# Patient Record
Sex: MALE | Race: WHITE | NOT HISPANIC OR LATINO | Employment: OTHER | URBAN - METROPOLITAN AREA
[De-identification: names, ages, dates, MRNs, and addresses within clinical notes are randomized per-mention and may not be internally consistent; named-entity substitution may affect disease eponyms.]

---

## 2021-05-27 ENCOUNTER — RECORDS - HEALTHEAST (OUTPATIENT)
Dept: ADMINISTRATIVE | Facility: CLINIC | Age: 81
End: 2021-05-27

## 2024-02-21 ENCOUNTER — LAB REQUISITION (OUTPATIENT)
Dept: LAB | Facility: CLINIC | Age: 84
End: 2024-02-21

## 2024-02-21 DIAGNOSIS — D64.9 ANEMIA, UNSPECIFIED: ICD-10-CM

## 2024-02-22 LAB
ANION GAP SERPL CALCULATED.3IONS-SCNC: 12 MMOL/L (ref 7–15)
BUN SERPL-MCNC: 20 MG/DL (ref 8–23)
CALCIUM SERPL-MCNC: 8.5 MG/DL (ref 8.8–10.2)
CHLORIDE SERPL-SCNC: 105 MMOL/L (ref 98–107)
CREAT SERPL-MCNC: 0.86 MG/DL (ref 0.67–1.17)
DEPRECATED HCO3 PLAS-SCNC: 21 MMOL/L (ref 22–29)
EGFRCR SERPLBLD CKD-EPI 2021: 86 ML/MIN/1.73M2
ERYTHROCYTE [DISTWIDTH] IN BLOOD BY AUTOMATED COUNT: 16.6 % (ref 10–15)
GLUCOSE SERPL-MCNC: 88 MG/DL (ref 70–99)
HCT VFR BLD AUTO: 28.6 % (ref 40–53)
HGB BLD-MCNC: 8.6 G/DL (ref 13.3–17.7)
MCH RBC QN AUTO: 30.5 PG (ref 26.5–33)
MCHC RBC AUTO-ENTMCNC: 30.1 G/DL (ref 31.5–36.5)
MCV RBC AUTO: 101 FL (ref 78–100)
PLATELET # BLD AUTO: 555 10E3/UL (ref 150–450)
POTASSIUM SERPL-SCNC: 4.5 MMOL/L (ref 3.4–5.3)
RBC # BLD AUTO: 2.82 10E6/UL (ref 4.4–5.9)
SODIUM SERPL-SCNC: 138 MMOL/L (ref 135–145)
WBC # BLD AUTO: 9.2 10E3/UL (ref 4–11)

## 2024-02-22 PROCEDURE — 36415 COLL VENOUS BLD VENIPUNCTURE: CPT | Performed by: NURSE PRACTITIONER

## 2024-02-22 PROCEDURE — 80048 BASIC METABOLIC PNL TOTAL CA: CPT | Performed by: NURSE PRACTITIONER

## 2024-02-22 PROCEDURE — 85027 COMPLETE CBC AUTOMATED: CPT | Performed by: NURSE PRACTITIONER

## 2024-02-22 PROCEDURE — P9603 ONE-WAY ALLOW PRORATED MILES: HCPCS | Performed by: NURSE PRACTITIONER

## 2024-03-06 ENCOUNTER — LAB REQUISITION (OUTPATIENT)
Dept: LAB | Facility: CLINIC | Age: 84
End: 2024-03-06

## 2024-03-06 DIAGNOSIS — D64.9 ANEMIA, UNSPECIFIED: ICD-10-CM

## 2024-03-07 LAB
ERYTHROCYTE [DISTWIDTH] IN BLOOD BY AUTOMATED COUNT: 16.3 % (ref 10–15)
HCT VFR BLD AUTO: 34.6 % (ref 40–53)
HGB BLD-MCNC: 10.5 G/DL (ref 13.3–17.7)
MCH RBC QN AUTO: 31.1 PG (ref 26.5–33)
MCHC RBC AUTO-ENTMCNC: 30.3 G/DL (ref 31.5–36.5)
MCV RBC AUTO: 102 FL (ref 78–100)
PLATELET # BLD AUTO: 255 10E3/UL (ref 150–450)
RBC # BLD AUTO: 3.38 10E6/UL (ref 4.4–5.9)
WBC # BLD AUTO: 6.3 10E3/UL (ref 4–11)

## 2024-03-07 PROCEDURE — P9603 ONE-WAY ALLOW PRORATED MILES: HCPCS | Performed by: INTERNAL MEDICINE

## 2024-03-07 PROCEDURE — 85027 COMPLETE CBC AUTOMATED: CPT | Performed by: INTERNAL MEDICINE

## 2024-03-07 PROCEDURE — 36415 COLL VENOUS BLD VENIPUNCTURE: CPT | Performed by: INTERNAL MEDICINE

## 2024-11-25 ENCOUNTER — HOSPITAL ENCOUNTER (EMERGENCY)
Facility: CLINIC | Age: 84
Discharge: HOME | End: 2024-11-25
Attending: EMERGENCY MEDICINE | Admitting: EMERGENCY MEDICINE
Payer: MEDICARE

## 2024-11-25 VITALS
SYSTOLIC BLOOD PRESSURE: 192 MMHG | HEART RATE: 50 BPM | TEMPERATURE: 96.9 F | RESPIRATION RATE: 18 BRPM | OXYGEN SATURATION: 100 % | DIASTOLIC BLOOD PRESSURE: 81 MMHG | WEIGHT: 164 LBS

## 2024-11-25 DIAGNOSIS — Z13.9 ENCOUNTER FOR MEDICAL SCREENING EXAMINATION: ICD-10-CM

## 2024-11-25 LAB
ATRIAL RATE - MUSE: 58 BPM
DIASTOLIC BLOOD PRESSURE - MUSE: NORMAL MMHG
HOLD SPECIMEN: NORMAL
INTERPRETATION ECG - MUSE: NORMAL
P AXIS - MUSE: 52 DEGREES
POTASSIUM SERPL-SCNC: 4.5 MMOL/L (ref 3.4–5.3)
PR INTERVAL - MUSE: 216 MS
QRS DURATION - MUSE: 110 MS
QT - MUSE: 424 MS
QTC - MUSE: 416 MS
R AXIS - MUSE: 5 DEGREES
SYSTOLIC BLOOD PRESSURE - MUSE: NORMAL MMHG
T AXIS - MUSE: 17 DEGREES
VENTRICULAR RATE- MUSE: 58 BPM

## 2024-11-25 PROCEDURE — 93005 ELECTROCARDIOGRAM TRACING: CPT | Performed by: EMERGENCY MEDICINE

## 2024-11-25 PROCEDURE — 36415 COLL VENOUS BLD VENIPUNCTURE: CPT | Performed by: EMERGENCY MEDICINE

## 2024-11-25 PROCEDURE — 99284 EMERGENCY DEPT VISIT MOD MDM: CPT

## 2024-11-25 PROCEDURE — 84132 ASSAY OF SERUM POTASSIUM: CPT | Performed by: EMERGENCY MEDICINE

## 2024-11-25 ASSESSMENT — ACTIVITIES OF DAILY LIVING (ADL): ADLS_ACUITY_SCORE: 41

## 2024-11-25 ASSESSMENT — COLUMBIA-SUICIDE SEVERITY RATING SCALE - C-SSRS
2. HAVE YOU ACTUALLY HAD ANY THOUGHTS OF KILLING YOURSELF IN THE PAST MONTH?: NO
1. IN THE PAST MONTH, HAVE YOU WISHED YOU WERE DEAD OR WISHED YOU COULD GO TO SLEEP AND NOT WAKE UP?: NO
6. HAVE YOU EVER DONE ANYTHING, STARTED TO DO ANYTHING, OR PREPARED TO DO ANYTHING TO END YOUR LIFE?: NO

## 2024-11-25 NOTE — DISCHARGE INSTRUCTIONS
The potassium here today is normal at 4.5.  Can continue your previous medications and follow-up with your doctor as needed.    Return to the ER for any new or worsening concerns.

## 2024-11-25 NOTE — ED TRIAGE NOTES
Patient presents to the ED with family from [facility] for concerns of elevated potassium. Had some labs drawn a couple days ago and was told his potassium was HIGH at 6.0.

## 2024-11-26 NOTE — ED PROVIDER NOTES
EMERGENCY DEPARTMENT ENCOUNTER      NAME: Shaun Berkowitz  AGE: 84 year old male  YOB: 1940  MRN: [id number]  EVALUATION DATE & TIME: 11/25/2024  3:23 PM    PCP: Marcella Patterson    ED PROVIDER: Kaia Gagnon MD      Chief Complaint   Patient presents with    Abnormal Labs         FINAL IMPRESSION:  1. Encounter for medical screening examination          ED COURSE & MEDICAL DECISION MAKING:    Pertinent Labs & Imaging studies reviewed. (See chart for details)    12:04 PM I introduced myself to the patient, obtained patient history, performed a physical exam, and discussed plan for ED workup including potential diagnostic laboratory/imaging studies and interventions.    84 year old male with a pertinent history of prior stroke with aphasia, anemia, BPH, coronary artery disease, CHF who presents to this ED for evaluation of concern for elevated potassium at his facility.  Reportedly his potassium was 6 a couple of days ago on outpatient routine draw.  They had not repeated this and sent him here for a repeat testing.  Has not had any issues with his potassium in the past per family.  He has no complaints here and is entirely asymptomatic.  Repeat potassium here is normal at 4.5.  I am suspicious that it may have been hemolyzed at the other facility.  EKG without any signs of hyperkalemia.  It is unchanged from his prior EKG and no signs of acute ischemia.  He is in a sinus rhythm.  He is hypertensive here however this did come down to 192/81 and is on multiple blood pressure medications.  He has no complaints and is asymptomatic and do not feel that this warrants further workup at this time as he again is asymptomatic and may have some whitecoat hypertension here in the ER.  Advised he follow-up with his primary care doctor.  Patient and family were in agreement with this plan.  He was discharged home in stable condition.  Given indications for return.         At the conclusion of the encounter I  discussed the results of all of the tests and the disposition. The questions were answered. The patient or family acknowledged understanding and was agreeable with the care plan.       Medical Decision Making  Obtained supplemental history:Supplemental history obtained?: Documented in chart  Reviewed external records: External records reviewed?: Documented in chart  Care impacted by chronic illness:Documented in Chart  Care significantly affected by social determinants of health:N/A  Did you consider but not order tests?: Work up considered but not performed and documented in chart, if applicable  Did you interpret images independently?: Independent interpretation of ECG and images noted in documentation, when applicable.  Consultation discussion with other provider:Did you involve another provider (consultant, , pharmacy, etc.)?: No  Discharge. No recommendations on prescription strength medication(s). See documentation for any additional details.    Not Applicable      MEDICATIONS GIVEN IN THE EMERGENCY:  Medications - No data to display    NEW PRESCRIPTIONS STARTED AT TODAY'S ER VISIT  There are no discharge medications for this patient.         =================================================================    HPI    Patient information was obtained from: Patient, wife, daughter    Shaun Berkowitz is a 84 year old male with a pertinent history of prior stroke with aphasia, anemia, BPH, coronary artery disease, CHF who presents to this ED for evaluation of concern for elevated potassium at his facility.  He lives in assisted living and they had drawn labs routinely a couple days ago and reportedly his potassium was 6 so they wanted this rechecked and sent him in here to do so.  He has absolutely no complaints.  Family agrees that he has been acting at his baseline.  Has had no nausea, vomiting, diarrhea.  Denies any chest pain, shortness of breath, abdominal pain, numbness, tingling, or new weakness.  He  feels entirely normal here.      REVIEW OF SYSTEMS   Pertinent positives and negatives are documented in the HPI. All other systems reviewed and are negative.      PAST MEDICAL HISTORY:  No past medical history on file.    PAST SURGICAL HISTORY:  No past surgical history on file.        CURRENT MEDICATIONS:    No current outpatient medications on file.      ALLERGIES:  No Known Allergies    FAMILY HISTORY:  No family history on file.    SOCIAL HISTORY:   Social History     Socioeconomic History    Marital status:      Social Drivers of Health     Financial Resource Strain: Low Risk  (2/10/2024)    Received from [email or web address]    Overall Financial Resource Strain (CARDIA)     Difficulty of Paying Living Expenses: Not hard at all   Food Insecurity: No Food Insecurity (2/10/2024)    Received from [email or web address]    Hunger Vital Sign     Worried About Running Out of Food in the Last Year: Never true     Ran Out of Food in the Last Year: Never true   Transportation Needs: No Transportation Needs (2/10/2024)    [facility]    PRAPARE - Transportation     Lack of Transportation (Medical): No     Lack of Transportation (Non-Medical): No   Physical Activity: Insufficiently Active (7/31/2023)    Received from [email or web address]    Exercise Vital Sign     Days of Exercise per Week: 7 days     Minutes of Exercise per Session: 10 min   Stress: No Stress Concern Present (7/31/2023)    Received from [email or web address]    [employer] Occupational [employer] - Occupational Stress Questionnaire     Feeling of Stress : Not at all   Social Connections: Moderately Isolated (7/31/2023)    [facility]    Social Connection and Isolation Panel [NHANES]     Frequency of Communication with Friends and Family: More  than three times a week     Frequency of Social Gatherings with Friends and Family: More than three times a week     Attends Voodoo Services: Never     Active Member of Clubs or Organizations: No     Attends Club or Organization Meetings: Never     Marital Status:    Interpersonal Safety: Patient Declined (2/9/2024)    Received from [facility]     Do you feel UNSAFE in any of your personal relationships with your family members or any other acquaintances?: Deferred   Housing Stability: Low Risk  (2/10/2024)    Received from [facility]     Retired - What is your living situation today? : I have a steady place to live       VITALS:  BP (!) 192/81   Pulse 50   Temp 96.9  F (36.1  C) (Temporal)   Resp 18   Wt 74.4 kg (164 lb)   SpO2 100%     PHYSICAL EXAM    Physical Exam  Constitutional: NAD, GCS 15  HENT: Normocephalic, Atraumatic, Bilateral external ears normal, Oropharynx normal, mucous membranes moist, Nose normal. Neck-  Normal range of motion, No tenderness, Supple, No stridor.    Eyes: PERRL, EOMI, Conjunctiva normal, No discharge.   Respiratory: Normal breath sounds, No respiratory distress, No wheezing or crackles, Speaks in full sentences easily.    Cardiovascular: Normal heart rate, Regular rhythm, No murmurs, No rubs, No gallops. 2+ radial pulses bilaterally  GI: Bowel sounds normal, Soft, No tenderness, No masses, No rebound or guarding. No   Musculoskeletal: 2+ DP pulses. No notable lower extremity edema.  No cyanosis, No clubbing. Good range of motion in all major joints. No tenderness to palpation or major deformities noted.   Integument: Warm, Dry, No erythema, No rash. No petechiae.   Neurologic: Alert & oriented x 3, 5/5 strength in all 4 extremities bilaterally. Sensation intact to light touch in all 4 extremities and the face bilaterally. No focal deficits noted.    Psychiatric: Affect normal, Judgment normal, Mood normal. Cooperative.      LAB:  All pertinent labs reviewed and interpreted.  Results for orders placed or performed during the hospital encounter of 11/25/24   Extra Blue Top Tube   Result Value Ref Range    Hold Specimen JIC    Extra Red Top Tube   Result Value Ref Range    Hold Specimen JIC    Extra Purple Top Tube   Result Value Ref Range    Hold Specimen JIC    Result Value Ref Range    Potassium 4.5 3.4 - 5.3 mmol/L   ECG 12-LEAD WITH MUSE (LHE)   Result Value Ref Range    Systolic Blood Pressure  mmHg    Diastolic Blood Pressure  mmHg    Ventricular Rate 58 BPM    Atrial Rate 58 BPM    LA Interval 216 ms    QRS Duration 110 ms     ms    QTc 416 ms    P Axis 52 degrees    R AXIS 5 degrees    T Axis 17 degrees    Interpretation ECG       Sinus bradycardia with sinus arrhythmia with 1st degree A-V block  Possible Left atrial enlargement  Left ventricular hypertrophy  Inferior infarct (cited on or before 20-Dec-2003)  Abnormal ECG  When compared with ECG of 20-Dec-2003 08:27,  No significant change was found  Confirmed by SEE ED PROVIDER NOTE FOR, ECG INTERPRETATION ([id number]),  JOSE ROMERO ([id number]) on 11/25/2024 3:26:37 PM         RADIOLOGY:  Reviewed all pertinent imaging. Please see official radiology report.  No orders to display       EKG:    Performed at: 3:26 pm    Impression:     Sinus bradycardia with sinus arrhythmia with 1st degree A-V block  Possible Left atrial enlargement  Left ventricular hypertrophy   Unchanged from prior on 5/8/2023 in Care Everywhere.    I have independently reviewed and interpreted the EKG(s) documented above.      Kaia Gagnon MD  [facility] EMERGENCY ROOM  [address] [address] [phone]       Kaia Gagnon MD  11/26/24 7963

## 2025-03-23 ENCOUNTER — APPOINTMENT (OUTPATIENT)
Dept: RADIOLOGY | Facility: CLINIC | Age: 85
DRG: 480 | End: 2025-03-23
Attending: EMERGENCY MEDICINE
Payer: MEDICARE

## 2025-03-23 ENCOUNTER — HOSPITAL ENCOUNTER (INPATIENT)
Facility: CLINIC | Age: 85
LOS: 3 days | Discharge: NURSING, REHAB OR LONG-TERM CARE | DRG: 480 | End: 2025-03-26
Attending: EMERGENCY MEDICINE | Admitting: HOSPITALIST
Payer: MEDICARE

## 2025-03-23 ENCOUNTER — APPOINTMENT (OUTPATIENT)
Dept: RADIOLOGY | Facility: CLINIC | Age: 85
DRG: 480 | End: 2025-03-23
Attending: STUDENT IN AN ORGANIZED HEALTH CARE EDUCATION/TRAINING PROGRAM
Payer: MEDICARE

## 2025-03-23 DIAGNOSIS — R33.9 URINARY RETENTION WITH INCOMPLETE BLADDER EMPTYING: ICD-10-CM

## 2025-03-23 DIAGNOSIS — S72.002A HIP FRACTURE, LEFT, CLOSED, INITIAL ENCOUNTER (H): Primary | ICD-10-CM

## 2025-03-23 DIAGNOSIS — I50.32 CHRONIC DIASTOLIC CONGESTIVE HEART FAILURE (H): ICD-10-CM

## 2025-03-23 LAB
ABO + RH BLD: NORMAL
ANION GAP SERPL CALCULATED.3IONS-SCNC: 11 MMOL/L (ref 7–15)
APTT PPP: 28 SECONDS (ref 22–38)
ATRIAL RATE - MUSE: 76 BPM
BASOPHILS # BLD AUTO: 0.1 10E3/UL (ref 0–0.2)
BASOPHILS NFR BLD AUTO: 1 %
BLD GP AB SCN SERPL QL: NEGATIVE
BUN SERPL-MCNC: 26.2 MG/DL (ref 8–23)
CALCIUM SERPL-MCNC: 9.6 MG/DL (ref 8.8–10.4)
CHLORIDE SERPL-SCNC: 108 MMOL/L (ref 98–107)
CREAT SERPL-MCNC: 0.99 MG/DL (ref 0.67–1.17)
DIASTOLIC BLOOD PRESSURE - MUSE: 61 MMHG
EGFRCR SERPLBLD CKD-EPI 2021: 76 ML/MIN/1.73M2
EOSINOPHIL # BLD AUTO: 0.6 10E3/UL (ref 0–0.7)
EOSINOPHIL NFR BLD AUTO: 7 %
ERYTHROCYTE [DISTWIDTH] IN BLOOD BY AUTOMATED COUNT: 12.5 % (ref 10–15)
GLUCOSE SERPL-MCNC: 133 MG/DL (ref 70–99)
HCO3 SERPL-SCNC: 22 MMOL/L (ref 22–29)
HCT VFR BLD AUTO: 38.6 % (ref 40–53)
HGB BLD-MCNC: 12.6 G/DL (ref 13.3–17.7)
IMM GRANULOCYTES # BLD: 0 10E3/UL
IMM GRANULOCYTES NFR BLD: 0 %
INR PPP: 0.96 (ref 0.85–1.15)
INTERPRETATION ECG - MUSE: NORMAL
LYMPHOCYTES # BLD AUTO: 2.8 10E3/UL (ref 0.8–5.3)
LYMPHOCYTES NFR BLD AUTO: 34 %
MAGNESIUM SERPL-MCNC: 2.2 MG/DL (ref 1.7–2.3)
MCH RBC QN AUTO: 33.3 PG (ref 26.5–33)
MCHC RBC AUTO-ENTMCNC: 32.6 G/DL (ref 31.5–36.5)
MCV RBC AUTO: 102 FL (ref 78–100)
MONOCYTES # BLD AUTO: 0.6 10E3/UL (ref 0–1.3)
MONOCYTES NFR BLD AUTO: 7 %
NEUTROPHILS # BLD AUTO: 4.3 10E3/UL (ref 1.6–8.3)
NEUTROPHILS NFR BLD AUTO: 51 %
NRBC # BLD AUTO: 0 10E3/UL
NRBC BLD AUTO-RTO: 0 /100
P AXIS - MUSE: 58 DEGREES
PLATELET # BLD AUTO: 232 10E3/UL (ref 150–450)
POTASSIUM SERPL-SCNC: 5.2 MMOL/L (ref 3.4–5.3)
PR INTERVAL - MUSE: 208 MS
QRS DURATION - MUSE: 102 MS
QT - MUSE: 392 MS
QTC - MUSE: 441 MS
R AXIS - MUSE: 23 DEGREES
RBC # BLD AUTO: 3.78 10E6/UL (ref 4.4–5.9)
SODIUM SERPL-SCNC: 141 MMOL/L (ref 135–145)
SPECIMEN EXP DATE BLD: NORMAL
SYSTOLIC BLOOD PRESSURE - MUSE: 166 MMHG
T AXIS - MUSE: -23 DEGREES
VENTRICULAR RATE- MUSE: 76 BPM
WBC # BLD AUTO: 8.3 10E3/UL (ref 4–11)

## 2025-03-23 PROCEDURE — 99285 EMERGENCY DEPT VISIT HI MDM: CPT

## 2025-03-23 PROCEDURE — 80048 BASIC METABOLIC PNL TOTAL CA: CPT | Performed by: EMERGENCY MEDICINE

## 2025-03-23 PROCEDURE — 86900 BLOOD TYPING SEROLOGIC ABO: CPT | Performed by: EMERGENCY MEDICINE

## 2025-03-23 PROCEDURE — 73502 X-RAY EXAM HIP UNI 2-3 VIEWS: CPT

## 2025-03-23 PROCEDURE — 85025 COMPLETE CBC W/AUTO DIFF WBC: CPT | Performed by: EMERGENCY MEDICINE

## 2025-03-23 PROCEDURE — 99222 1ST HOSP IP/OBS MODERATE 55: CPT | Mod: AI | Performed by: STUDENT IN AN ORGANIZED HEALTH CARE EDUCATION/TRAINING PROGRAM

## 2025-03-23 PROCEDURE — 73552 X-RAY EXAM OF FEMUR 2/>: CPT | Mod: LT

## 2025-03-23 PROCEDURE — 36415 COLL VENOUS BLD VENIPUNCTURE: CPT | Performed by: EMERGENCY MEDICINE

## 2025-03-23 PROCEDURE — 250N000011 HC RX IP 250 OP 636: Mod: JZ | Performed by: STUDENT IN AN ORGANIZED HEALTH CARE EDUCATION/TRAINING PROGRAM

## 2025-03-23 PROCEDURE — 250N000013 HC RX MED GY IP 250 OP 250 PS 637: Performed by: STUDENT IN AN ORGANIZED HEALTH CARE EDUCATION/TRAINING PROGRAM

## 2025-03-23 PROCEDURE — 83735 ASSAY OF MAGNESIUM: CPT | Performed by: STUDENT IN AN ORGANIZED HEALTH CARE EDUCATION/TRAINING PROGRAM

## 2025-03-23 PROCEDURE — 86850 RBC ANTIBODY SCREEN: CPT | Performed by: EMERGENCY MEDICINE

## 2025-03-23 PROCEDURE — 85730 THROMBOPLASTIN TIME PARTIAL: CPT | Performed by: EMERGENCY MEDICINE

## 2025-03-23 PROCEDURE — 85610 PROTHROMBIN TIME: CPT | Performed by: EMERGENCY MEDICINE

## 2025-03-23 PROCEDURE — 120N000001 HC R&B MED SURG/OB

## 2025-03-23 PROCEDURE — 250N000011 HC RX IP 250 OP 636: Performed by: STUDENT IN AN ORGANIZED HEALTH CARE EDUCATION/TRAINING PROGRAM

## 2025-03-23 PROCEDURE — 93005 ELECTROCARDIOGRAM TRACING: CPT | Performed by: STUDENT IN AN ORGANIZED HEALTH CARE EDUCATION/TRAINING PROGRAM

## 2025-03-23 RX ORDER — METOPROLOL SUCCINATE 25 MG/1
25 TABLET, EXTENDED RELEASE ORAL 2 TIMES DAILY
Status: DISCONTINUED | OUTPATIENT
Start: 2025-03-23 | End: 2025-03-26 | Stop reason: HOSPADM

## 2025-03-23 RX ORDER — PREGABALIN 75 MG/1
75 CAPSULE ORAL 2 TIMES DAILY
COMMUNITY
Start: 2024-02-16

## 2025-03-23 RX ORDER — NALOXONE HYDROCHLORIDE 0.4 MG/ML
0.4 INJECTION, SOLUTION INTRAMUSCULAR; INTRAVENOUS; SUBCUTANEOUS
Status: DISCONTINUED | OUTPATIENT
Start: 2025-03-23 | End: 2025-03-26 | Stop reason: HOSPADM

## 2025-03-23 RX ORDER — GUAIFENESIN 200 MG/10ML
200 LIQUID ORAL EVERY 4 HOURS PRN
Status: DISCONTINUED | OUTPATIENT
Start: 2025-03-23 | End: 2025-03-26 | Stop reason: HOSPADM

## 2025-03-23 RX ORDER — IBUPROFEN 400 MG/1
400 TABLET, FILM COATED ORAL EVERY 6 HOURS PRN
COMMUNITY

## 2025-03-23 RX ORDER — LOSARTAN POTASSIUM 25 MG/1
25 TABLET ORAL DAILY
COMMUNITY
Start: 2025-03-14

## 2025-03-23 RX ORDER — OMEPRAZOLE 20 MG/1
20 CAPSULE, DELAYED RELEASE ORAL EVERY EVENING
COMMUNITY
Start: 2024-02-16

## 2025-03-23 RX ORDER — OMEPRAZOLE 20 MG/1
20 CAPSULE, DELAYED RELEASE ORAL EVERY EVENING
Status: DISCONTINUED | OUTPATIENT
Start: 2025-03-23 | End: 2025-03-23

## 2025-03-23 RX ORDER — AMLODIPINE BESYLATE 10 MG/1
10 TABLET ORAL DAILY
COMMUNITY
Start: 2025-03-14

## 2025-03-23 RX ORDER — ASPIRIN 81 MG/1
81 TABLET ORAL DAILY
Status: DISCONTINUED | OUTPATIENT
Start: 2025-03-24 | End: 2025-03-24 | Stop reason: DRUGHIGH

## 2025-03-23 RX ORDER — ATORVASTATIN CALCIUM 40 MG/1
80 TABLET, FILM COATED ORAL AT BEDTIME
Status: DISCONTINUED | OUTPATIENT
Start: 2025-03-23 | End: 2025-03-26 | Stop reason: HOSPADM

## 2025-03-23 RX ORDER — ACETAMINOPHEN 325 MG/1
650 TABLET ORAL EVERY 6 HOURS PRN
Status: DISCONTINUED | OUTPATIENT
Start: 2025-03-23 | End: 2025-03-23

## 2025-03-23 RX ORDER — METOPROLOL SUCCINATE 25 MG/1
25 TABLET, EXTENDED RELEASE ORAL 2 TIMES DAILY
COMMUNITY
Start: 2023-05-12

## 2025-03-23 RX ORDER — LOSARTAN POTASSIUM 25 MG/1
25 TABLET ORAL DAILY
Status: DISCONTINUED | OUTPATIENT
Start: 2025-03-24 | End: 2025-03-26 | Stop reason: HOSPADM

## 2025-03-23 RX ORDER — LIDOCAINE 40 MG/G
CREAM TOPICAL
Status: DISCONTINUED | OUTPATIENT
Start: 2025-03-23 | End: 2025-03-26 | Stop reason: HOSPADM

## 2025-03-23 RX ORDER — HYDROMORPHONE HYDROCHLORIDE 1 MG/ML
0.3 INJECTION, SOLUTION INTRAMUSCULAR; INTRAVENOUS; SUBCUTANEOUS
Status: DISCONTINUED | OUTPATIENT
Start: 2025-03-23 | End: 2025-03-24

## 2025-03-23 RX ORDER — HEPARIN SODIUM 5000 [USP'U]/.5ML
5000 INJECTION, SOLUTION INTRAVENOUS; SUBCUTANEOUS EVERY 8 HOURS
Status: COMPLETED | OUTPATIENT
Start: 2025-03-23 | End: 2025-03-23

## 2025-03-23 RX ORDER — PANTOPRAZOLE SODIUM 40 MG/1
40 TABLET, DELAYED RELEASE ORAL EVERY EVENING
Status: DISCONTINUED | OUTPATIENT
Start: 2025-03-23 | End: 2025-03-26 | Stop reason: HOSPADM

## 2025-03-23 RX ORDER — ACETAMINOPHEN 650 MG/1
650 SUPPOSITORY RECTAL EVERY 6 HOURS PRN
Status: DISCONTINUED | OUTPATIENT
Start: 2025-03-23 | End: 2025-03-23

## 2025-03-23 RX ORDER — ACETAMINOPHEN 500 MG
1000 TABLET ORAL 3 TIMES DAILY
Status: DISCONTINUED | OUTPATIENT
Start: 2025-03-23 | End: 2025-03-24

## 2025-03-23 RX ORDER — NALOXONE HYDROCHLORIDE 0.4 MG/ML
0.2 INJECTION, SOLUTION INTRAMUSCULAR; INTRAVENOUS; SUBCUTANEOUS
Status: DISCONTINUED | OUTPATIENT
Start: 2025-03-23 | End: 2025-03-26 | Stop reason: HOSPADM

## 2025-03-23 RX ORDER — MIRTAZAPINE 15 MG/1
15 TABLET, FILM COATED ORAL AT BEDTIME
Status: DISCONTINUED | OUTPATIENT
Start: 2025-03-23 | End: 2025-03-26 | Stop reason: HOSPADM

## 2025-03-23 RX ORDER — HYDROMORPHONE HCL IN WATER/PF 6 MG/30 ML
0.2 PATIENT CONTROLLED ANALGESIA SYRINGE INTRAVENOUS
Status: DISCONTINUED | OUTPATIENT
Start: 2025-03-23 | End: 2025-03-24

## 2025-03-23 RX ORDER — LOPERAMIDE HYDROCHLORIDE 2 MG/1
2 TABLET ORAL 4 TIMES DAILY PRN
COMMUNITY

## 2025-03-23 RX ORDER — MIRTAZAPINE 15 MG/1
15 TABLET, FILM COATED ORAL AT BEDTIME
COMMUNITY
Start: 2024-02-16

## 2025-03-23 RX ORDER — POLYETHYLENE GLYCOL 3350 17 G/17G
17 POWDER, FOR SOLUTION ORAL
Status: DISCONTINUED | OUTPATIENT
Start: 2025-03-23 | End: 2025-03-24

## 2025-03-23 RX ORDER — ERGOCALCIFEROL 1.25 MG/1
50000 CAPSULE, LIQUID FILLED ORAL WEEKLY
COMMUNITY

## 2025-03-23 RX ORDER — AMLODIPINE BESYLATE 10 MG/1
10 TABLET ORAL DAILY
Status: DISCONTINUED | OUTPATIENT
Start: 2025-03-24 | End: 2025-03-26 | Stop reason: HOSPADM

## 2025-03-23 RX ORDER — POLYETHYLENE GLYCOL 3350 17 G/17G
1 POWDER, FOR SOLUTION ORAL DAILY
COMMUNITY

## 2025-03-23 RX ORDER — ASPIRIN 81 MG/1
81 TABLET ORAL DAILY
Status: ON HOLD | COMMUNITY
End: 2025-03-26

## 2025-03-23 RX ORDER — POLYETHYLENE GLYCOL 3350 17 G/17G
1 POWDER, FOR SOLUTION ORAL DAILY PRN
COMMUNITY

## 2025-03-23 RX ORDER — ACETAMINOPHEN 500 MG
1000 TABLET ORAL 3 TIMES DAILY
COMMUNITY

## 2025-03-23 RX ORDER — CALCIUM CARBONATE 500 MG/1
1000 TABLET, CHEWABLE ORAL 4 TIMES DAILY PRN
Status: DISCONTINUED | OUTPATIENT
Start: 2025-03-23 | End: 2025-03-26 | Stop reason: HOSPADM

## 2025-03-23 RX ORDER — ATORVASTATIN CALCIUM 80 MG/1
80 TABLET, FILM COATED ORAL AT BEDTIME
COMMUNITY
Start: 2023-07-18

## 2025-03-23 RX ORDER — PREGABALIN 75 MG/1
75 CAPSULE ORAL 2 TIMES DAILY
Status: DISCONTINUED | OUTPATIENT
Start: 2025-03-23 | End: 2025-03-26 | Stop reason: HOSPADM

## 2025-03-23 RX ORDER — OXYCODONE HYDROCHLORIDE 5 MG/1
5 TABLET ORAL EVERY 4 HOURS PRN
Status: DISCONTINUED | OUTPATIENT
Start: 2025-03-23 | End: 2025-03-24

## 2025-03-23 RX ORDER — ERGOCALCIFEROL 1.25 MG/1
50000 CAPSULE, LIQUID FILLED ORAL WEEKLY
Status: DISCONTINUED | OUTPATIENT
Start: 2025-03-29 | End: 2025-03-26 | Stop reason: HOSPADM

## 2025-03-23 RX ADMIN — MIRTAZAPINE 15 MG: 15 TABLET, FILM COATED ORAL at 22:28

## 2025-03-23 RX ADMIN — ATORVASTATIN CALCIUM 80 MG: 40 TABLET, FILM COATED ORAL at 22:28

## 2025-03-23 RX ADMIN — ACETAMINOPHEN 1000 MG: 500 TABLET ORAL at 18:50

## 2025-03-23 RX ADMIN — POLYETHYLENE GLYCOL 3350 17 G: 17 POWDER, FOR SOLUTION ORAL at 19:26

## 2025-03-23 RX ADMIN — OXYCODONE 5 MG: 5 TABLET ORAL at 20:37

## 2025-03-23 RX ADMIN — PANTOPRAZOLE SODIUM 40 MG: 40 TABLET, DELAYED RELEASE ORAL at 19:26

## 2025-03-23 RX ADMIN — HYDROMORPHONE HYDROCHLORIDE 0.2 MG: 0.2 INJECTION, SOLUTION INTRAMUSCULAR; INTRAVENOUS; SUBCUTANEOUS at 22:56

## 2025-03-23 RX ADMIN — PREGABALIN 75 MG: 75 CAPSULE ORAL at 20:37

## 2025-03-23 RX ADMIN — METOPROLOL SUCCINATE 25 MG: 25 TABLET, EXTENDED RELEASE ORAL at 19:44

## 2025-03-23 RX ADMIN — HEPARIN SODIUM 5000 UNITS: 5000 INJECTION, SOLUTION INTRAVENOUS; SUBCUTANEOUS at 19:33

## 2025-03-23 ASSESSMENT — ACTIVITIES OF DAILY LIVING (ADL)
ADLS_ACUITY_SCORE: 41
ADLS_ACUITY_SCORE: 54
ADLS_ACUITY_SCORE: 41
ADLS_ACUITY_SCORE: 41
ADLS_ACUITY_SCORE: 54
ADLS_ACUITY_SCORE: 54

## 2025-03-23 NOTE — ED PROVIDER NOTES
EMERGENCY DEPARTMENT ENCOUNTER     NAME: Shaun Berkowitz   AGE: 84 year old male   YOB: 1940   MRN: [id number]   EVALUATION DATE & TIME: 3/23/2025  3:53 PM   PCP: Marcella Patterson     Chief Complaint   Patient presents with    Fall    Hip Pain   :    FINAL IMPRESSION       1. Hip fracture, left, closed, initial encounter (H)           ED COURSE & MEDICAL DECISION MAKING    5:07 PM I spoke with Dr. Correa, [facility].   5:09 PM I spoke with Dr. Ramos, hospitalist.    Pertinent Labs & Imaging studies reviewed. (See chart for details)   84 year old male  presents to the Emergency Department for evaluation of a mechanical fall resulting in left hip pain. Initial Vitals Reviewed. Initial exam notable for uncomfortable appearing male who is sitting with his left hip externally rotated and I strongly suspect fracture.  He has had previous surgical repair from fracture in the past.  Neurovascular status is more difficult to examine given hemiparesis from previous stroke, but there is no signs of a cold extremity.  X-ray reviewed interpreted by me and confirmed by radiology does show a fracture.  Preop labs initiated and currently patient is being kept NPO.  Case discussed with orthopedics and hospitalist and the hope is that if he can get medically cleared they will do surgery tonight versus needing to wait until tomorrow based on medical clearance issues.         At the conclusion of the encounter I discussed the results of all of the tests and the disposition. The questions were answered. The patient or family acknowledged understanding and was agreeable with the care plan.     0 minutes critical care time, see procedure note below for details if relevant    Medical Decision Making    History:  Supplemental history from: EMS  External Record(s) reviewed: Outpatient Record: Last nursing home visit 3/20/2024    Work Up:  Chart documentation includes differential considered and any EKGs or imaging  independently interpreted by provider, where specified.  In additional to work up documented, I considered the following work up:     External consultation:  Discussion of management with another provider: Documented in chart, if applicable, hospitalist, orthopedics    Complicating factors:  Care impacted by chronic illness: CVA  Care affected by social determinants of health: Access to Medical Care    Disposition considerations: Admit.    Not Applicable   None              MEDICATIONS GIVEN IN THE EMERGENCY:   Medications - No data to display   NEW PRESCRIPTIONS STARTED AT TODAY'S ER VISIT   New Prescriptions    No medications on file     ================================================================   HISTORY OF PRESENT ILLNESS       Patient information was obtained from: Patient and EMS  Use of Intrepreter: N/A   Shaun Berkowitz is a 84 year old male with history of previous hip fracture, CVA who presents for evaluation of left hip pain after a fall.  Patient notes a mechanical fall, now having left hip pain.  Denies hitting his head or losing consciousness.  No other complaints of injury or pain.  EMS gave 100 mcg of fentanyl during transport.    ================================================================        PAST HISTORY     PAST MEDICAL HISTORY:   No past medical history on file.   PAST SURGICAL HISTORY:   No past surgical history on file.   CURRENT MEDICATIONS:   amLODIPine (NORVASC) 10 MG tablet  atorvastatin (LIPITOR) 80 MG tablet  losartan (COZAAR) 25 MG tablet  metoprolol succinate ER (TOPROL XL) 25 MG 24 hr tablet  mirtazapine (REMERON) 15 MG tablet  omeprazole (PRILOSEC) 20 MG DR capsule  pregabalin (LYRICA) 75 MG capsule      ALLERGIES:   No Known Allergies   FAMILY HISTORY:   No family history on file.   SOCIAL HISTORY:   Social History     Socioeconomic History    Marital status:      Social Drivers of Health     Financial Resource Strain: Low Risk  (2/10/2024)    [facility]    Overall Financial Resource Strain (CARDIA)     Difficulty of Paying Living Expenses: Not hard at all   Food Insecurity: No Food Insecurity (2/10/2024)    Received from [facility]    Hunger Vital Sign     Worried About Running Out of Food in the Last Year: Never true     Ran Out of Food in the Last Year: Never true   Transportation Needs: No Transportation Needs (2/10/2024)    [facility]    PRAPARE - Transportation     Lack of Transportation (Medical): No     Lack of Transportation (Non-Medical): No   Physical Activity: Insufficiently Active (7/31/2023)    Received from [facility]    Exercise Vital Sign     Days of Exercise per Week: 7 days     Minutes of Exercise per Session: 10 min   Stress: No Stress Concern Present (7/31/2023)    Received from [facility]    [employer] [employer] of Occupational Health - Occupational Stress Questionnaire     Feeling of Stress : Not at all   Social Connections: Moderately Isolated (7/31/2023)    Received from [facility]    Social Connection and Isolation Panel [NHANES]     Frequency of Communication with Friends and Family: More than three times a week     Frequency of Social Gatherings with Friends and Family: More than three times a week     Attends Amish Services: Never     Active Member of Clubs or Organizations: No     Attends Club or Organization Meetings: Never     Marital Status:    Interpersonal Safety: Patient Declined (2/9/2024)    Received from [facility] [employer]     Do you feel UNSAFE in any of your personal relationships with your family members or any other acquaintances?: Deferred   Housing Stability: Low Risk  (2/10/2024)    Received from [facility]     [facility] Housing Domain     Retired - What is your living situation today? : I have a steady place to live        VITALS  Patient Vitals for the past 24 hrs:   BP Temp Temp src Pulse Resp SpO2 Height Weight   03/23/25 1558 (!) 184/92 97.5  F (36.4  C) Oral 72 17 94 % 1.727 m (5' 8\") 72.6 kg (160 lb)        ================================================================    PHYSICAL EXAM     VITAL SIGNS: BP (!) 184/92   Pulse 72   Temp 97.5  F (36.4  C) (Oral)   Resp 17   Ht 1.727 m (5' 8\")   Wt 72.6 kg (160 lb)   SpO2 94%   BMI 24.33 kg/m     Constitutional:  Awake, uncomfortable appearing  HENT:  Atraumatic, oropharynx without exudate or erythema, membranes moist  Lymph:  No adenopathy  Eyes: EOM intact, PERRL, no injection  Neck: Supple  Respiratory:  Clear to auscultation bilaterally, no wheezes or crackles   Cardiovascular:  Regular rate and rhythm, single S1 and S2   GI:  Soft, nontender, nondistended, no rebound or guarding   Musculoskeletal:  Moves all extremities, no lower extremity edema, sitting with his left hip externally rotated and flexed with tenderness over the anterior and lateral hip.  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   Labs Ordered and Resulted from Time of ED Arrival to Time of ED Departure   BASIC METABOLIC PANEL - Abnormal       Result Value    Sodium 141      Potassium 5.2      Chloride 108 (*)     Carbon Dioxide (CO2) 22      Anion Gap 11      Urea Nitrogen 26.2 (*)     Creatinine 0.99      GFR Estimate 76      Calcium 9.6      Glucose 133 (*)    CBC WITH PLATELETS AND DIFFERENTIAL - Abnormal    WBC Count 8.3      RBC Count 3.78 (*)     Hemoglobin 12.6 (*)     Hematocrit 38.6 (*)      (*)     MCH 33.3 (*)     MCHC 32.6      RDW 12.5      " Platelet Count 232      % Neutrophils 51      % Lymphocytes 34      % Monocytes 7      % Eosinophils 7      % Basophils 1      % Immature Granulocytes 0      NRBCs per 100 WBC 0      Absolute Neutrophils 4.3      Absolute Lymphocytes 2.8      Absolute Monocytes 0.6      Absolute Eosinophils 0.6      Absolute Basophils 0.1      Absolute Immature Granulocytes 0.0      Absolute NRBCs 0.0     PARTIAL THROMBOPLASTIN TIME - Normal    aPTT 28     INR - Normal    INR 0.96     TYPE AND SCREEN, ADULT    ABO/RH(D) O POS      Antibody Screen Negative      SPECIMEN EXPIRATION DATE [id number]     ABO/RH TYPE AND SCREEN        ===============================================================  RADIOLOGY       Reviewed all pertinent imaging. Please see official radiology report.   XR Pelvis and Hip Left 2 Views   Final Result   IMPRESSION: Acute mildly displaced left proximal femoral fracture centered in the intertrochanteric region. Bones are markedly demineralized and there is mild left hip arthrosis. Evidence of prior left pelvic/acetabular ORIF.      Atherosclerotic vascular calcifications.      NOTE: ABNORMAL REPORT      THE DICTATION ABOVE DESCRIBES AN ABNORMALITY FOR WHICH FOLLOW-UP IS NEEDED.       XR Femur Left 2 Views    (Results Pending)         ================================================================  EKG         I have independently reviewed and interpreted the EKG(s) documented above.     ================================================================  PROCEDURES           Racheal Hoyos M.D.   Emergency Medicine   [facility] EMERGENCY ROOM  [address] [address]  [phone]  Dept: [phone]        Racheal Hoyos MD  03/23/25 1738

## 2025-03-23 NOTE — ED TRIAGE NOTES
Arrived to ED via EMS after a fall. Patient unable to move left hip. Pain is 10/10 upon movement. EMS gave 100mcg fentanyl during transport.      Triage Assessment (Adult)       Row Name 03/23/25 1558          Triage Assessment    Airway WDL WDL        Respiratory WDL    Respiratory WDL WDL        Skin Circulation/Temperature WDL    Skin Circulation/Temperature WDL WDL        Cardiac WDL    Cardiac WDL WDL        Cognitive/Neuro/Behavioral WDL    Cognitive/Neuro/Behavioral WDL WDL

## 2025-03-23 NOTE — TREATMENT PLAN
Ortho treatment plan:    Called by ED staff about Shaun Berkowitz who is an 84M with left intertrochanteric femur fracture after a fall at his care facility earlier today.    Patient not personally evaluated at time of this note.    Plan:  - Admit to medicine. Appreciate perioperative optimization/clearance.  - OR as soon as tonight if medically cleared/optimized, otherwise tomorrow if not cleared  - NWB/bedrest until OR  - full femur XR ordered for fx eval  - NPO  - Full consult to follow.    Nestor Correa MD  [facility]    Update: Discussed with admitting medicine MD. Patient has new arrhythmia. Will undergo workup prior to clearance for OR. Anticipate OR tomorrow. NPO at midnight. Full consult in AM.

## 2025-03-23 NOTE — H&P
[facility] MEDICINE ADMISSION HISTORY AND PHYSICAL   Date of Admission: 3/23/2025  Shaun Berkowitz, 1940, [id number]    Identification/Summary:   Pedro Pablo Berkowitz is a 83 year old male, with a hx of CAD s/p CABG in 1999, ischemic cardiomyopathy, Heart failure with mildly reduced ejection fraction, pulm HTN, chronic L ICA occlusion, with prior CVAs, residual right sided weakness and aphasia, pernicious anemia, syncope, chronic back pain, pelvic fracture and humerus fracture in 2/2024 s/p fixation at [facility] who presented to following a fall at home.  In the ED, blood pressure 184/92, other vital signs stable.  Irregular heart rate was noticed in ED.  Labs are remarkable for anemia 12.6, which has improved from prior creatinine of 0.99.  X-ray showed a left hip fracture.  Orthopedic surgery was consulted in the ED, offering surgery for him.  Hospitalist team will admit patient and gather medical information to prep for surgery.    Assessment and Plan:  Left hip fracture  Surgery when medically cleared per surgery  Oxycodone 2.5 to 5 mg for pain every 6 hours as needed  Strict bedrest  1 dose heparin DVT Prophylaxis tonight    Preoperative evaluation  Irregular heart rate noticed in the ED  Order EKG  Order echocardiogram  Retrieve previous medical records. Calling his PCP office.    Irregular heartbeat  Heart rate ranged from 40 to 66 when I was in patient's room  EKG  Start telemetry  Echo  Check magnesium    Left ICA occlusion  History of CVA with right hemiparesis and aphasia  Hold aspirin for now  Continue atorvastatin    CAD s/p CABG in 1999  Hold aspirin for now  Continue atorvastatin    Pernicious anemia  -Monitor    Hypertension  -Continue amlodipine 10 mg  Continue metoprolol 25 mg  Hold losartan 25 mg    CKD stage II  eGFR 76, creatinine 0.99 on admission    Code Status: Full Code    IVF: None    FoleyNot present    Disposition: Inpatient     Clinically Significant  Risk Factors Present on Admission          # Hyperchloremia: Highest Cl = 108 mmol/L in last 2 days, will monitor as appropriate            # Drug Induced Platelet Defect: home medication list includes an antiplatelet medication   # Hypertension: Home medication list includes antihypertensive(s)                      Chief Complaint Fall and L hip pain     HISTORY   Pedro Pablo Berkowitz is a 83 year old male, with a hx of CAD s/p CABG in 1999, ischemic cardiomyopathy, pulm HTN, chronic L ICA occlusion, with prior CVAs, residual right sided weakness and aphasia, on ASA, pernicious anemia, syncope, chronic back pain, pelvic fracture and humerus fracture in 2/2024 [place]/p [place] [facility] [place] presented to following a fall at home.     History obtained from patient.  Patient is sometimes confused with my question, not sure if it is a hearing problem or comprehension problem but he would still give me an answer.    Patient lives with wife at assisted living.  He was cooking when he was trying to move around today.  He missed a step and fell onto his left hip.  He then has pain on the left hip whenever he moves.  He was on the ground for about 15 minutes and was helped get up.  He denies feeling dizzy, loss of consciousness, chest pain, cough, dyspnea, diarrhea recently.      Past Medical History     No past medical history on file.     Patient Active Problem List    Diagnosis Date Noted    Hip fracture, left, closed, initial encounter (H) 03/23/2025     Priority: Medium     Surgical History   No past surgical history on file.  Family History    No family history on file.   Social History        Allergies   No Known Allergies  Prior to Admission Medications      Prior to Admission Medications   Prescriptions Last Dose Informant Patient Reported? Taking?   acetaminophen (TYLENOL) 500 MG tablet 3/23/2025 Noon  Yes Yes   Sig: Take 1,000 mg by mouth 3 times daily.   amLODIPine (NORVASC) 10 MG tablet 3/23/2025 Morning  Yes  Yes   Sig: Take 10 mg by mouth daily.   aspirin 81 MG EC tablet 3/23/2025 Morning  Yes Yes   Sig: Take 81 mg by mouth daily.   atorvastatin (LIPITOR) 80 MG tablet 3/22/2025 Bedtime  Yes Yes   Sig: Take 80 mg by mouth at bedtime.   dimethicone-zinc oxide (AMADO PROTECT) external cream Unknown  Yes Yes   Sig: Apply topically daily as needed for dry skin or other. To intact stage 1 pressure area and or irritated skin, rash, or excoriation.   ibuprofen (ADVIL/MOTRIN) 400 MG tablet Unknown  Yes Yes   Sig: Take 400 mg by mouth every 6 hours as needed (pain).   loperamide (IMODIUM A-D) 2 MG tablet Unknown  Yes Yes   Sig: Take 2 mg by mouth 4 times daily as needed for diarrhea. Take 2 tabs (4 mg) at first loose stool, then 1 tab (2 mg) as needed after each subsequent loose stool.   losartan (COZAAR) 25 MG tablet 3/23/2025 Morning  Yes Yes   Sig: Take 25 mg by mouth daily.   metoprolol succinate ER (TOPROL XL) 25 MG 24 hr tablet 3/23/2025 Morning  Yes Yes   Sig: Take 25 mg by mouth 2 times daily.   mirtazapine (REMERON) 15 MG tablet 3/22/2025 Bedtime  Yes Yes   Sig: Take 15 mg by mouth at bedtime.   omeprazole (PRILOSEC) 20 MG DR capsule 3/22/2025 Evening  Yes Yes   Sig: Take 20 mg by mouth every evening.   polyethylene glycol (MIRALAX) 17 g packet 3/23/2025 Morning  Yes Yes   Sig: Take 1 packet by mouth daily.   polyethylene glycol (MIRALAX) 17 g packet Unknown  Yes Yes   Sig: Take 1 packet by mouth daily as needed for constipation.   pregabalin (LYRICA) 75 MG capsule 3/23/2025 Morning  Yes Yes   Sig: Take 75 mg by mouth 2 times daily.   vitamin D2 (ERGOCALCIFEROL) 30308 units (1250 mcg) capsule 3/22/2025  Yes Yes   Sig: Take 50,000 Units by mouth once a week. Saturdays      Facility-Administered Medications: None      Review of Systems     A 12 point comprehensive review of systems was negative except as noted above in HPI.    PHYSICAL EXAMINATION     Vitals      Temp:  [97.5  F (36.4  C)] 97.5  F (36.4  C)  Pulse:  [72]  72  Resp:  [17] 17  BP: (184)/(92) 184/92  SpO2:  [94 %] 94 %    Examination     General Appearance: Alert and wake, not in distress  Respiratory: clear lungs, no crackles or wheezing  Cardiovascular: rhythmic, normal S1 and S2, no murmur  GI: soft, non-tender, normal bowel sound  Neurology: oriented x 3  Psych: cooperative and calm, normal affect      Pertinent Lab     Results for orders placed or performed during the hospital encounter of 03/23/25 (from the past 24 hours)   CBC with platelets differential    Narrative    The following orders were created for panel order CBC with platelets differential.  Procedure                               Abnormality         Status                     ---------                               -----------         ------                     CBC with platelets and ...[[id number]]  Abnormal            Final result                 Please view results for these tests on the individual orders.   Basic metabolic panel   Result Value Ref Range    Sodium 141 135 - 145 mmol/L    Potassium 5.2 3.4 - 5.3 mmol/L    Chloride 108 (H) 98 - 107 mmol/L    Carbon Dioxide (CO2) 22 22 - 29 mmol/L    Anion Gap 11 7 - 15 mmol/L    Urea Nitrogen 26.2 (H) 8.0 - 23.0 mg/dL    Creatinine 0.99 0.67 - 1.17 mg/dL    GFR Estimate 76 >60 mL/min/1.73m2    Calcium 9.6 8.8 - 10.4 mg/dL    Glucose 133 (H) 70 - 99 mg/dL   Partial thromboplastin time   Result Value Ref Range    aPTT 28 22 - 38 Seconds   INR   Result Value Ref Range    INR 0.96 0.85 - 1.15   ABO/Rh type and screen    Narrative    The following orders were created for panel order ABO/Rh type and screen.  Procedure                               Abnormality         Status                     ---------                               -----------         ------                     Adult Type and Screen[[id number]]                           Final result                 Please view results for these tests on the individual orders.   CBC with platelets and  differential   Result Value Ref Range    WBC Count 8.3 4.0 - 11.0 10e3/uL    RBC Count 3.78 (L) 4.40 - 5.90 10e6/uL    Hemoglobin 12.6 (L) 13.3 - 17.7 g/dL    Hematocrit 38.6 (L) 40.0 - 53.0 %     (H) 78 - 100 fL    MCH 33.3 (H) 26.5 - 33.0 pg    MCHC 32.6 31.5 - 36.5 g/dL    RDW 12.5 10.0 - 15.0 %    Platelet Count 232 150 - 450 10e3/uL    % Neutrophils 51 %    % Lymphocytes 34 %    % Monocytes 7 %    % Eosinophils 7 %    % Basophils 1 %    % Immature Granulocytes 0 %    NRBCs per 100 WBC 0 <1 /100    Absolute Neutrophils 4.3 1.6 - 8.3 10e3/uL    Absolute Lymphocytes 2.8 0.8 - 5.3 10e3/uL    Absolute Monocytes 0.6 0.0 - 1.3 10e3/uL    Absolute Eosinophils 0.6 0.0 - 0.7 10e3/uL    Absolute Basophils 0.1 0.0 - 0.2 10e3/uL    Absolute Immature Granulocytes 0.0 <=0.4 10e3/uL    Absolute NRBCs 0.0 10e3/uL   Adult Type and Screen   Result Value Ref Range    ABO/RH(D) O POS     Antibody Screen Negative Negative    SPECIMEN EXPIRATION DATE [id number]    XR Pelvis and Hip Left 2 Views    Narrative    EXAM: XR PELVIS AND HIP LEFT 2 VIEWS  LOCATION: [facility]  DATE: 3/23/2025    INDICATION: Fall, pain.  COMPARISON: None.      Impression    IMPRESSION: Acute mildly displaced left proximal femoral fracture centered in the intertrochanteric region. Bones are markedly demineralized and there is mild left hip arthrosis. Evidence of prior left pelvic/acetabular ORIF.    Atherosclerotic vascular calcifications.    NOTE: ABNORMAL REPORT    THE DICTATION ABOVE DESCRIBES AN ABNORMALITY FOR WHICH FOLLOW-UP IS NEEDED.        Pertinent Radiology     Radiology Results:   Recent Results (from the past 24 hours)   XR Pelvis and Hip Left 2 Views    Narrative    EXAM: XR PELVIS AND HIP LEFT 2 VIEWS  LOCATION: [facility]  DATE: 3/23/2025    INDICATION: Fall, pain.  COMPARISON: None.      Impression    IMPRESSION: Acute mildly displaced left proximal femoral fracture centered in the  intertrochanteric region. Bones are markedly demineralized and there is mild left hip arthrosis. Evidence of prior left pelvic/acetabular ORIF.    Atherosclerotic vascular calcifications.    NOTE: ABNORMAL REPORT    THE DICTATION ABOVE DESCRIBES AN ABNORMALITY FOR WHICH FOLLOW-UP IS NEEDED.      I spoke with Dr. Hoyos or Dr. Correa and patient's family to discuss patient's care.    KESHA SCHMIDT MD  [facility]   Phone: #[phone]

## 2025-03-23 NOTE — PHARMACY-ADMISSION MEDICATION HISTORY
Pharmacist Admission Medication History    Admission medication history is complete. The information provided in this note is only as accurate as the sources available at the time of the update.    Information Source(s): Facility ([facility] medication list/MAR via phone    Pertinent Information: none    Changes made to PTA medication list:  Added: acetaminophen, amlodipine, aspirin, atorvastatin, losartan, metoprolol, mirtazapine, omeprazole, Miralax daily, pregabalin, vitamin D2, Ren protect, ibuprofen, loperamide, Miralax PRN,   Deleted: None  Changed: None    Allergies reviewed with patient and updates made in EHR: yes    Medication History Completed By: Ciara Hull RP 3/23/2025 5:29 PM    PTA Med List   Medication Sig Last Dose/Taking    acetaminophen (TYLENOL) 500 MG tablet Take 1,000 mg by mouth 3 times daily. 3/23/2025 Noon    amLODIPine (NORVASC) 10 MG tablet Take 10 mg by mouth daily. 3/23/2025 Morning    aspirin 81 MG EC tablet Take 81 mg by mouth daily. 3/23/2025 Morning    atorvastatin (LIPITOR) 80 MG tablet Take 80 mg by mouth at bedtime. 3/22/2025 Bedtime    dimethicone-zinc oxide (REN PROTECT) external cream Apply topically daily as needed for dry skin or other. To intact stage 1 pressure area and or irritated skin, rash, or excoriation. Unknown    ibuprofen (ADVIL/MOTRIN) 400 MG tablet Take 400 mg by mouth every 6 hours as needed (pain). Unknown    loperamide (IMODIUM A-D) 2 MG tablet Take 2 mg by mouth 4 times daily as needed for diarrhea. Take 2 tabs (4 mg) at first loose stool, then 1 tab (2 mg) as needed after each subsequent loose stool. Unknown    losartan (COZAAR) 25 MG tablet Take 25 mg by mouth daily. 3/23/2025 Morning    metoprolol succinate ER (TOPROL XL) 25 MG 24 hr tablet Take 25 mg by mouth 2 times daily. 3/23/2025 Morning    mirtazapine (REMERON) 15 MG tablet Take 15 mg by mouth at bedtime. 3/22/2025 Bedtime    omeprazole (PRILOSEC) 20 MG DR capsule Take 20 mg by mouth every  evening. 3/22/2025 Evening    polyethylene glycol (MIRALAX) 17 g packet Take 1 packet by mouth daily. 3/23/2025 Morning    polyethylene glycol (MIRALAX) 17 g packet Take 1 packet by mouth daily as needed for constipation. Unknown    pregabalin (LYRICA) 75 MG capsule Take 75 mg by mouth 2 times daily. 3/23/2025 Morning    vitamin D2 (ERGOCALCIFEROL) 59798 units (1250 mcg) capsule Take 50,000 Units by mouth once a week. Saturdays 3/22/2025

## 2025-03-23 NOTE — ED NOTES
Bed: [facility]-  Expected date:   Expected time:   Means of arrival:   Comments:  [facility]  L hip pain 84M

## 2025-03-24 ENCOUNTER — APPOINTMENT (OUTPATIENT)
Dept: RADIOLOGY | Facility: CLINIC | Age: 85
DRG: 480 | End: 2025-03-24
Attending: PHYSICIAN ASSISTANT
Payer: MEDICARE

## 2025-03-24 ENCOUNTER — APPOINTMENT (OUTPATIENT)
Dept: RADIOLOGY | Facility: CLINIC | Age: 85
DRG: 480 | End: 2025-03-24
Attending: STUDENT IN AN ORGANIZED HEALTH CARE EDUCATION/TRAINING PROGRAM
Payer: MEDICARE

## 2025-03-24 ENCOUNTER — APPOINTMENT (OUTPATIENT)
Dept: CARDIOLOGY | Facility: CLINIC | Age: 85
DRG: 480 | End: 2025-03-24
Attending: STUDENT IN AN ORGANIZED HEALTH CARE EDUCATION/TRAINING PROGRAM
Payer: MEDICARE

## 2025-03-24 ENCOUNTER — ANESTHESIA (OUTPATIENT)
Dept: SURGERY | Facility: CLINIC | Age: 85
DRG: 480 | End: 2025-03-24
Payer: MEDICARE

## 2025-03-24 ENCOUNTER — APPOINTMENT (OUTPATIENT)
Dept: RADIOLOGY | Facility: CLINIC | Age: 85
DRG: 480 | End: 2025-03-24
Attending: HOSPITALIST
Payer: MEDICARE

## 2025-03-24 ENCOUNTER — ANESTHESIA EVENT (OUTPATIENT)
Dept: SURGERY | Facility: CLINIC | Age: 85
DRG: 480 | End: 2025-03-24
Payer: MEDICARE

## 2025-03-24 LAB
ALBUMIN SERPL BCG-MCNC: 4 G/DL (ref 3.5–5.2)
ALBUMIN UR-MCNC: 30 MG/DL
ALP SERPL-CCNC: 101 U/L (ref 40–150)
ALT SERPL W P-5'-P-CCNC: 15 U/L (ref 0–70)
APPEARANCE UR: CLEAR
AST SERPL W P-5'-P-CCNC: 31 U/L (ref 0–45)
BILIRUB DIRECT SERPL-MCNC: 0.27 MG/DL (ref 0–0.3)
BILIRUB SERPL-MCNC: 0.7 MG/DL
BILIRUB UR QL STRIP: NEGATIVE
COLOR UR AUTO: YELLOW
GLUCOSE UR STRIP-MCNC: NEGATIVE MG/DL
HGB UR QL STRIP: ABNORMAL
HOLD SPECIMEN: NORMAL
HYALINE CASTS: 3 /LPF
KETONES UR STRIP-MCNC: NEGATIVE MG/DL
LEUKOCYTE ESTERASE UR QL STRIP: NEGATIVE
LVEF ECHO: NORMAL
MUCOUS THREADS #/AREA URNS LPF: PRESENT /LPF
NITRATE UR QL: NEGATIVE
NT-PROBNP SERPL-MCNC: 3998 PG/ML (ref 0–1800)
PH UR STRIP: 5.5 [PH] (ref 5–7)
PROT SERPL-MCNC: 6.4 G/DL (ref 6.4–8.3)
RBC URINE: 6 /HPF
SP GR UR STRIP: 1.03 (ref 1–1.03)
SQUAMOUS EPITHELIAL: <1 /HPF
UROBILINOGEN UR STRIP-MCNC: NORMAL MG/DL
WBC URINE: 2 /HPF

## 2025-03-24 PROCEDURE — 71045 X-RAY EXAM CHEST 1 VIEW: CPT

## 2025-03-24 PROCEDURE — 710N000010 HC RECOVERY PHASE 1, LEVEL 2, PER MIN: Performed by: STUDENT IN AN ORGANIZED HEALTH CARE EDUCATION/TRAINING PROGRAM

## 2025-03-24 PROCEDURE — 255N000002 HC RX 255 OP 636: Performed by: STUDENT IN AN ORGANIZED HEALTH CARE EDUCATION/TRAINING PROGRAM

## 2025-03-24 PROCEDURE — 250N000011 HC RX IP 250 OP 636: Performed by: STUDENT IN AN ORGANIZED HEALTH CARE EDUCATION/TRAINING PROGRAM

## 2025-03-24 PROCEDURE — 81001 URINALYSIS AUTO W/SCOPE: CPT | Performed by: HOSPITALIST

## 2025-03-24 PROCEDURE — 999N000141 HC STATISTIC PRE-PROCEDURE NURSING ASSESSMENT: Performed by: STUDENT IN AN ORGANIZED HEALTH CARE EDUCATION/TRAINING PROGRAM

## 2025-03-24 PROCEDURE — 999N000065 XR FEMUR PORT LEFT 2 VIEWS: Mod: LT

## 2025-03-24 PROCEDURE — 83880 ASSAY OF NATRIURETIC PEPTIDE: CPT | Performed by: HOSPITALIST

## 2025-03-24 PROCEDURE — 250N000013 HC RX MED GY IP 250 OP 250 PS 637: Performed by: PHYSICIAN ASSISTANT

## 2025-03-24 PROCEDURE — 99222 1ST HOSP IP/OBS MODERATE 55: CPT | Performed by: INTERNAL MEDICINE

## 2025-03-24 PROCEDURE — 0T9B70Z DRAINAGE OF BLADDER WITH DRAINAGE DEVICE, VIA NATURAL OR ARTIFICIAL OPENING: ICD-10-PCS | Performed by: STUDENT IN AN ORGANIZED HEALTH CARE EDUCATION/TRAINING PROGRAM

## 2025-03-24 PROCEDURE — 250N000009 HC RX 250: Performed by: HOSPITALIST

## 2025-03-24 PROCEDURE — 120N000001 HC R&B MED SURG/OB

## 2025-03-24 PROCEDURE — 36415 COLL VENOUS BLD VENIPUNCTURE: CPT | Performed by: STUDENT IN AN ORGANIZED HEALTH CARE EDUCATION/TRAINING PROGRAM

## 2025-03-24 PROCEDURE — 258N000003 HC RX IP 258 OP 636: Performed by: ANESTHESIOLOGY

## 2025-03-24 PROCEDURE — 250N000013 HC RX MED GY IP 250 OP 250 PS 637: Performed by: HOSPITALIST

## 2025-03-24 PROCEDURE — 93306 TTE W/DOPPLER COMPLETE: CPT | Mod: 26 | Performed by: INTERNAL MEDICINE

## 2025-03-24 PROCEDURE — 250N000013 HC RX MED GY IP 250 OP 250 PS 637: Performed by: STUDENT IN AN ORGANIZED HEALTH CARE EDUCATION/TRAINING PROGRAM

## 2025-03-24 PROCEDURE — 250N000025 HC SEVOFLURANE, PER MIN: Performed by: STUDENT IN AN ORGANIZED HEALTH CARE EDUCATION/TRAINING PROGRAM

## 2025-03-24 PROCEDURE — 250N000009 HC RX 250: Performed by: STUDENT IN AN ORGANIZED HEALTH CARE EDUCATION/TRAINING PROGRAM

## 2025-03-24 PROCEDURE — 250N000011 HC RX IP 250 OP 636: Performed by: NURSE ANESTHETIST, CERTIFIED REGISTERED

## 2025-03-24 PROCEDURE — 272N000002 HC OR SUPPLY OTHER OPNP: Performed by: STUDENT IN AN ORGANIZED HEALTH CARE EDUCATION/TRAINING PROGRAM

## 2025-03-24 PROCEDURE — 370N000017 HC ANESTHESIA TECHNICAL FEE, PER MIN: Performed by: STUDENT IN AN ORGANIZED HEALTH CARE EDUCATION/TRAINING PROGRAM

## 2025-03-24 PROCEDURE — C8929 TTE W OR WO FOL WCON,DOPPLER: HCPCS

## 2025-03-24 PROCEDURE — C1713 ANCHOR/SCREW BN/BN,TIS/BN: HCPCS | Performed by: STUDENT IN AN ORGANIZED HEALTH CARE EDUCATION/TRAINING PROGRAM

## 2025-03-24 PROCEDURE — 0QS736Z REPOSITION LEFT UPPER FEMUR WITH INTRAMEDULLARY INTERNAL FIXATION DEVICE, PERCUTANEOUS APPROACH: ICD-10-PCS | Performed by: STUDENT IN AN ORGANIZED HEALTH CARE EDUCATION/TRAINING PROGRAM

## 2025-03-24 PROCEDURE — 250N000011 HC RX IP 250 OP 636: Performed by: ANESTHESIOLOGY

## 2025-03-24 PROCEDURE — 250N000011 HC RX IP 250 OP 636: Mod: JZ | Performed by: STUDENT IN AN ORGANIZED HEALTH CARE EDUCATION/TRAINING PROGRAM

## 2025-03-24 PROCEDURE — 999N000157 HC STATISTIC RCP TIME EA 10 MIN

## 2025-03-24 PROCEDURE — 99232 SBSQ HOSP IP/OBS MODERATE 35: CPT | Performed by: HOSPITALIST

## 2025-03-24 PROCEDURE — 258N000003 HC RX IP 258 OP 636: Performed by: NURSE ANESTHETIST, CERTIFIED REGISTERED

## 2025-03-24 PROCEDURE — 272N000001 HC OR GENERAL SUPPLY STERILE: Performed by: STUDENT IN AN ORGANIZED HEALTH CARE EDUCATION/TRAINING PROGRAM

## 2025-03-24 PROCEDURE — 250N000011 HC RX IP 250 OP 636: Mod: JZ | Performed by: ANESTHESIOLOGY

## 2025-03-24 PROCEDURE — 360N000084 HC SURGERY LEVEL 4 W/ FLUORO, PER MIN: Performed by: STUDENT IN AN ORGANIZED HEALTH CARE EDUCATION/TRAINING PROGRAM

## 2025-03-24 PROCEDURE — 80076 HEPATIC FUNCTION PANEL: CPT | Performed by: STUDENT IN AN ORGANIZED HEALTH CARE EDUCATION/TRAINING PROGRAM

## 2025-03-24 PROCEDURE — 250N000011 HC RX IP 250 OP 636: Performed by: PHYSICIAN ASSISTANT

## 2025-03-24 PROCEDURE — 250N000009 HC RX 250: Performed by: NURSE ANESTHETIST, CERTIFIED REGISTERED

## 2025-03-24 PROCEDURE — 999N000065 XR PELVIS PORT 1/2 VIEWS

## 2025-03-24 PROCEDURE — 999N000182 XR SURGERY CARM FLUORO GREATER THAN 5 MIN

## 2025-03-24 DEVICE — IMPLANTABLE DEVICE: Type: IMPLANTABLE DEVICE | Site: HIP | Status: FUNCTIONAL

## 2025-03-24 RX ORDER — HYDROMORPHONE HYDROCHLORIDE 1 MG/ML
0.5 INJECTION, SOLUTION INTRAMUSCULAR; INTRAVENOUS; SUBCUTANEOUS
Status: DISCONTINUED | OUTPATIENT
Start: 2025-03-24 | End: 2025-03-26 | Stop reason: HOSPADM

## 2025-03-24 RX ORDER — FENTANYL CITRATE 50 UG/ML
25-100 INJECTION, SOLUTION INTRAMUSCULAR; INTRAVENOUS
Status: DISCONTINUED | OUTPATIENT
Start: 2025-03-24 | End: 2025-03-24 | Stop reason: HOSPADM

## 2025-03-24 RX ORDER — OXYCODONE HYDROCHLORIDE 5 MG/1
5 TABLET ORAL
Status: DISCONTINUED | OUTPATIENT
Start: 2025-03-24 | End: 2025-03-24 | Stop reason: HOSPADM

## 2025-03-24 RX ORDER — NALOXONE HYDROCHLORIDE 0.4 MG/ML
0.1 INJECTION, SOLUTION INTRAMUSCULAR; INTRAVENOUS; SUBCUTANEOUS
Status: DISCONTINUED | OUTPATIENT
Start: 2025-03-24 | End: 2025-03-24 | Stop reason: HOSPADM

## 2025-03-24 RX ORDER — LIDOCAINE 40 MG/G
CREAM TOPICAL
Status: DISCONTINUED | OUTPATIENT
Start: 2025-03-24 | End: 2025-03-24 | Stop reason: HOSPADM

## 2025-03-24 RX ORDER — SODIUM CHLORIDE, SODIUM LACTATE, POTASSIUM CHLORIDE, CALCIUM CHLORIDE 600; 310; 30; 20 MG/100ML; MG/100ML; MG/100ML; MG/100ML
INJECTION, SOLUTION INTRAVENOUS CONTINUOUS
Status: DISCONTINUED | OUTPATIENT
Start: 2025-03-24 | End: 2025-03-24 | Stop reason: HOSPADM

## 2025-03-24 RX ORDER — LABETALOL HYDROCHLORIDE 5 MG/ML
10 INJECTION, SOLUTION INTRAVENOUS
Status: DISCONTINUED | OUTPATIENT
Start: 2025-03-24 | End: 2025-03-24 | Stop reason: HOSPADM

## 2025-03-24 RX ORDER — PROPOFOL 10 MG/ML
INJECTION, EMULSION INTRAVENOUS PRN
Status: DISCONTINUED | OUTPATIENT
Start: 2025-03-24 | End: 2025-03-24

## 2025-03-24 RX ORDER — DEXAMETHASONE SODIUM PHOSPHATE 4 MG/ML
4 INJECTION, SOLUTION INTRA-ARTICULAR; INTRALESIONAL; INTRAMUSCULAR; INTRAVENOUS; SOFT TISSUE
Status: DISCONTINUED | OUTPATIENT
Start: 2025-03-24 | End: 2025-03-24 | Stop reason: HOSPADM

## 2025-03-24 RX ORDER — BISACODYL 10 MG
10 SUPPOSITORY, RECTAL RECTAL DAILY PRN
Status: DISCONTINUED | OUTPATIENT
Start: 2025-03-27 | End: 2025-03-26 | Stop reason: HOSPADM

## 2025-03-24 RX ORDER — ACETAMINOPHEN 325 MG/1
975 TABLET ORAL ONCE
Status: COMPLETED | OUTPATIENT
Start: 2025-03-24 | End: 2025-03-24

## 2025-03-24 RX ORDER — FENTANYL CITRATE 50 UG/ML
25 INJECTION, SOLUTION INTRAMUSCULAR; INTRAVENOUS EVERY 5 MIN PRN
Status: DISCONTINUED | OUTPATIENT
Start: 2025-03-24 | End: 2025-03-24 | Stop reason: HOSPADM

## 2025-03-24 RX ORDER — TRANEXAMIC ACID 650 MG/1
1950 TABLET ORAL ONCE
Status: DISCONTINUED | OUTPATIENT
Start: 2025-03-24 | End: 2025-03-24

## 2025-03-24 RX ORDER — BUPIVACAINE HYDROCHLORIDE 2.5 MG/ML
INJECTION, SOLUTION INFILTRATION; PERINEURAL PRN
Status: DISCONTINUED | OUTPATIENT
Start: 2025-03-24 | End: 2025-03-24 | Stop reason: HOSPADM

## 2025-03-24 RX ORDER — BUPIVACAINE HYDROCHLORIDE 2.5 MG/ML
INJECTION, SOLUTION INFILTRATION; PERINEURAL
Status: DISCONTINUED
Start: 2025-03-24 | End: 2025-03-24 | Stop reason: HOSPADM

## 2025-03-24 RX ORDER — ACETAMINOPHEN 325 MG/1
975 TABLET ORAL EVERY 8 HOURS
Status: DISCONTINUED | OUTPATIENT
Start: 2025-03-24 | End: 2025-03-24

## 2025-03-24 RX ORDER — HYDROMORPHONE HCL IN WATER/PF 6 MG/30 ML
0.2 PATIENT CONTROLLED ANALGESIA SYRINGE INTRAVENOUS
Status: DISCONTINUED | OUTPATIENT
Start: 2025-03-24 | End: 2025-03-26 | Stop reason: HOSPADM

## 2025-03-24 RX ORDER — OXYCODONE HYDROCHLORIDE 5 MG/1
10 TABLET ORAL EVERY 4 HOURS PRN
Status: DISCONTINUED | OUTPATIENT
Start: 2025-03-24 | End: 2025-03-26 | Stop reason: HOSPADM

## 2025-03-24 RX ORDER — TAMSULOSIN HYDROCHLORIDE 0.4 MG/1
0.4 CAPSULE ORAL DAILY
Status: DISCONTINUED | OUTPATIENT
Start: 2025-03-24 | End: 2025-03-26 | Stop reason: HOSPADM

## 2025-03-24 RX ORDER — CEFAZOLIN SODIUM 1 G/3ML
1 INJECTION, POWDER, FOR SOLUTION INTRAMUSCULAR; INTRAVENOUS EVERY 8 HOURS
Status: COMPLETED | OUTPATIENT
Start: 2025-03-24 | End: 2025-03-25

## 2025-03-24 RX ORDER — LIDOCAINE HYDROCHLORIDE 20 MG/ML
JELLY TOPICAL
Status: COMPLETED | OUTPATIENT
Start: 2025-03-24 | End: 2025-03-24

## 2025-03-24 RX ORDER — BUPIVACAINE HYDROCHLORIDE 2.5 MG/ML
INJECTION, SOLUTION EPIDURAL; INFILTRATION; INTRACAUDAL; PERINEURAL
Status: COMPLETED | OUTPATIENT
Start: 2025-03-24 | End: 2025-03-24

## 2025-03-24 RX ORDER — POLYETHYLENE GLYCOL 3350 17 G/17G
17 POWDER, FOR SOLUTION ORAL DAILY
Status: DISCONTINUED | OUTPATIENT
Start: 2025-03-25 | End: 2025-03-26 | Stop reason: HOSPADM

## 2025-03-24 RX ORDER — FLUMAZENIL 0.1 MG/ML
0.2 INJECTION, SOLUTION INTRAVENOUS
Status: DISCONTINUED | OUTPATIENT
Start: 2025-03-24 | End: 2025-03-24 | Stop reason: HOSPADM

## 2025-03-24 RX ORDER — ONDANSETRON 2 MG/ML
INJECTION INTRAMUSCULAR; INTRAVENOUS PRN
Status: DISCONTINUED | OUTPATIENT
Start: 2025-03-24 | End: 2025-03-24

## 2025-03-24 RX ORDER — ASPIRIN 81 MG/1
81 TABLET ORAL 2 TIMES DAILY
Status: DISCONTINUED | OUTPATIENT
Start: 2025-03-24 | End: 2025-03-26 | Stop reason: HOSPADM

## 2025-03-24 RX ORDER — OXYCODONE HYDROCHLORIDE 5 MG/1
10 TABLET ORAL
Status: DISCONTINUED | OUTPATIENT
Start: 2025-03-24 | End: 2025-03-24 | Stop reason: HOSPADM

## 2025-03-24 RX ORDER — FENTANYL CITRATE 50 UG/ML
50 INJECTION, SOLUTION INTRAMUSCULAR; INTRAVENOUS EVERY 5 MIN PRN
Status: DISCONTINUED | OUTPATIENT
Start: 2025-03-24 | End: 2025-03-24 | Stop reason: HOSPADM

## 2025-03-24 RX ORDER — HYDRALAZINE HYDROCHLORIDE 20 MG/ML
2.5-5 INJECTION INTRAMUSCULAR; INTRAVENOUS EVERY 10 MIN PRN
Status: DISCONTINUED | OUTPATIENT
Start: 2025-03-24 | End: 2025-03-24 | Stop reason: HOSPADM

## 2025-03-24 RX ORDER — LIDOCAINE HYDROCHLORIDE 10 MG/ML
INJECTION, SOLUTION INFILTRATION; PERINEURAL PRN
Status: DISCONTINUED | OUTPATIENT
Start: 2025-03-24 | End: 2025-03-24

## 2025-03-24 RX ORDER — DEXAMETHASONE SODIUM PHOSPHATE 10 MG/ML
INJECTION, SOLUTION INTRAMUSCULAR; INTRAVENOUS PRN
Status: DISCONTINUED | OUTPATIENT
Start: 2025-03-24 | End: 2025-03-24

## 2025-03-24 RX ORDER — ONDANSETRON 2 MG/ML
4 INJECTION INTRAMUSCULAR; INTRAVENOUS EVERY 30 MIN PRN
Status: DISCONTINUED | OUTPATIENT
Start: 2025-03-24 | End: 2025-03-24 | Stop reason: HOSPADM

## 2025-03-24 RX ORDER — ACETAMINOPHEN 325 MG/1
975 TABLET ORAL EVERY 8 HOURS
Status: DISCONTINUED | OUTPATIENT
Start: 2025-03-24 | End: 2025-03-26 | Stop reason: HOSPADM

## 2025-03-24 RX ORDER — ONDANSETRON 2 MG/ML
4 INJECTION INTRAMUSCULAR; INTRAVENOUS EVERY 6 HOURS PRN
Status: DISCONTINUED | OUTPATIENT
Start: 2025-03-24 | End: 2025-03-26 | Stop reason: HOSPADM

## 2025-03-24 RX ORDER — LIDOCAINE HYDROCHLORIDE 20 MG/ML
JELLY TOPICAL ONCE
Status: DISCONTINUED | OUTPATIENT
Start: 2025-03-24 | End: 2025-03-24

## 2025-03-24 RX ORDER — HYDROMORPHONE HCL IN WATER/PF 6 MG/30 ML
0.2 PATIENT CONTROLLED ANALGESIA SYRINGE INTRAVENOUS EVERY 5 MIN PRN
Status: DISCONTINUED | OUTPATIENT
Start: 2025-03-24 | End: 2025-03-24 | Stop reason: HOSPADM

## 2025-03-24 RX ORDER — HYDROMORPHONE HCL IN WATER/PF 6 MG/30 ML
0.4 PATIENT CONTROLLED ANALGESIA SYRINGE INTRAVENOUS EVERY 5 MIN PRN
Status: DISCONTINUED | OUTPATIENT
Start: 2025-03-24 | End: 2025-03-24 | Stop reason: HOSPADM

## 2025-03-24 RX ORDER — LIDOCAINE 40 MG/G
CREAM TOPICAL
Status: DISCONTINUED | OUTPATIENT
Start: 2025-03-24 | End: 2025-03-26 | Stop reason: HOSPADM

## 2025-03-24 RX ORDER — ONDANSETRON 4 MG/1
4 TABLET, ORALLY DISINTEGRATING ORAL EVERY 30 MIN PRN
Status: DISCONTINUED | OUTPATIENT
Start: 2025-03-24 | End: 2025-03-24 | Stop reason: HOSPADM

## 2025-03-24 RX ORDER — OXYCODONE HYDROCHLORIDE 5 MG/1
5 TABLET ORAL EVERY 4 HOURS PRN
Status: DISCONTINUED | OUTPATIENT
Start: 2025-03-24 | End: 2025-03-26 | Stop reason: HOSPADM

## 2025-03-24 RX ORDER — AMOXICILLIN 250 MG
1 CAPSULE ORAL 2 TIMES DAILY
Status: DISCONTINUED | OUTPATIENT
Start: 2025-03-24 | End: 2025-03-26 | Stop reason: HOSPADM

## 2025-03-24 RX ORDER — CEFAZOLIN SODIUM 2 G/50ML
2 SOLUTION INTRAVENOUS SEE ADMIN INSTRUCTIONS
Status: DISCONTINUED | OUTPATIENT
Start: 2025-03-24 | End: 2025-03-24 | Stop reason: HOSPADM

## 2025-03-24 RX ORDER — SODIUM CHLORIDE, SODIUM LACTATE, POTASSIUM CHLORIDE, CALCIUM CHLORIDE 600; 310; 30; 20 MG/100ML; MG/100ML; MG/100ML; MG/100ML
INJECTION, SOLUTION INTRAVENOUS CONTINUOUS PRN
Status: DISCONTINUED | OUTPATIENT
Start: 2025-03-24 | End: 2025-03-24

## 2025-03-24 RX ORDER — ONDANSETRON 4 MG/1
4 TABLET, ORALLY DISINTEGRATING ORAL EVERY 6 HOURS PRN
Status: DISCONTINUED | OUTPATIENT
Start: 2025-03-24 | End: 2025-03-26 | Stop reason: HOSPADM

## 2025-03-24 RX ORDER — HYDROXYZINE HYDROCHLORIDE 10 MG/1
10 TABLET, FILM COATED ORAL EVERY 6 HOURS PRN
Status: DISCONTINUED | OUTPATIENT
Start: 2025-03-24 | End: 2025-03-26 | Stop reason: HOSPADM

## 2025-03-24 RX ORDER — CEFAZOLIN SODIUM 2 G/50ML
2 SOLUTION INTRAVENOUS
Status: COMPLETED | OUTPATIENT
Start: 2025-03-24 | End: 2025-03-24

## 2025-03-24 RX ORDER — MAGNESIUM HYDROXIDE 1200 MG/15ML
LIQUID ORAL PRN
Status: DISCONTINUED | OUTPATIENT
Start: 2025-03-24 | End: 2025-03-24 | Stop reason: HOSPADM

## 2025-03-24 RX ORDER — SODIUM CHLORIDE, SODIUM LACTATE, POTASSIUM CHLORIDE, CALCIUM CHLORIDE 600; 310; 30; 20 MG/100ML; MG/100ML; MG/100ML; MG/100ML
INJECTION, SOLUTION INTRAVENOUS CONTINUOUS
Status: DISCONTINUED | OUTPATIENT
Start: 2025-03-24 | End: 2025-03-25

## 2025-03-24 RX ORDER — FENTANYL CITRATE 50 UG/ML
INJECTION, SOLUTION INTRAMUSCULAR; INTRAVENOUS PRN
Status: DISCONTINUED | OUTPATIENT
Start: 2025-03-24 | End: 2025-03-24

## 2025-03-24 RX ORDER — PROCHLORPERAZINE MALEATE 5 MG/1
5 TABLET ORAL EVERY 6 HOURS PRN
Status: DISCONTINUED | OUTPATIENT
Start: 2025-03-24 | End: 2025-03-26 | Stop reason: HOSPADM

## 2025-03-24 RX ADMIN — ATORVASTATIN CALCIUM 80 MG: 40 TABLET, FILM COATED ORAL at 21:10

## 2025-03-24 RX ADMIN — ROCURONIUM BROMIDE 50 MG: 10 INJECTION, SOLUTION INTRAVENOUS at 12:50

## 2025-03-24 RX ADMIN — ASPIRIN 81 MG: 81 TABLET, COATED ORAL at 20:00

## 2025-03-24 RX ADMIN — TRANEXAMIC ACID 1 G: 1 INJECTION, SOLUTION INTRAVENOUS at 13:05

## 2025-03-24 RX ADMIN — POLYETHYLENE GLYCOL 3350 17 G: 17 POWDER, FOR SOLUTION ORAL at 07:55

## 2025-03-24 RX ADMIN — FENTANYL CITRATE 50 MCG: 50 INJECTION INTRAMUSCULAR; INTRAVENOUS at 12:02

## 2025-03-24 RX ADMIN — CEFAZOLIN SODIUM 2 G: 2 SOLUTION INTRAVENOUS at 12:46

## 2025-03-24 RX ADMIN — ACETAMINOPHEN 1000 MG: 500 TABLET ORAL at 07:55

## 2025-03-24 RX ADMIN — HYDROMORPHONE HYDROCHLORIDE 0.2 MG: 0.2 INJECTION, SOLUTION INTRAMUSCULAR; INTRAVENOUS; SUBCUTANEOUS at 07:55

## 2025-03-24 RX ADMIN — FENTANYL CITRATE 50 MCG: 50 INJECTION INTRAMUSCULAR; INTRAVENOUS at 13:30

## 2025-03-24 RX ADMIN — AMLODIPINE BESYLATE 10 MG: 10 TABLET ORAL at 07:55

## 2025-03-24 RX ADMIN — LIDOCAINE HYDROCHLORIDE 50 MG: 10 INJECTION, SOLUTION INFILTRATION; PERINEURAL at 12:50

## 2025-03-24 RX ADMIN — PREGABALIN 75 MG: 75 CAPSULE ORAL at 10:14

## 2025-03-24 RX ADMIN — ONDANSETRON 4 MG: 2 INJECTION INTRAMUSCULAR; INTRAVENOUS at 12:50

## 2025-03-24 RX ADMIN — MIRTAZAPINE 15 MG: 15 TABLET, FILM COATED ORAL at 21:10

## 2025-03-24 RX ADMIN — TAMSULOSIN HYDROCHLORIDE 0.4 MG: 0.4 CAPSULE ORAL at 18:10

## 2025-03-24 RX ADMIN — BUPIVACAINE HYDROCHLORIDE 30 ML: 2.5 INJECTION, SOLUTION EPIDURAL; INFILTRATION; INTRACAUDAL at 12:10

## 2025-03-24 RX ADMIN — METOPROLOL SUCCINATE 25 MG: 25 TABLET, EXTENDED RELEASE ORAL at 07:55

## 2025-03-24 RX ADMIN — PANTOPRAZOLE SODIUM 40 MG: 40 TABLET, DELAYED RELEASE ORAL at 20:00

## 2025-03-24 RX ADMIN — CEFAZOLIN 1 G: 1 INJECTION, POWDER, FOR SOLUTION INTRAMUSCULAR; INTRAVENOUS at 21:10

## 2025-03-24 RX ADMIN — LIDOCAINE HYDROCHLORIDE: 20 JELLY TOPICAL at 17:55

## 2025-03-24 RX ADMIN — PREGABALIN 75 MG: 75 CAPSULE ORAL at 20:00

## 2025-03-24 RX ADMIN — OXYCODONE 5 MG: 5 TABLET ORAL at 04:43

## 2025-03-24 RX ADMIN — ACETAMINOPHEN 975 MG: 325 TABLET ORAL at 17:28

## 2025-03-24 RX ADMIN — SODIUM CHLORIDE, SODIUM LACTATE, POTASSIUM CHLORIDE, AND CALCIUM CHLORIDE: .6; .31; .03; .02 INJECTION, SOLUTION INTRAVENOUS at 11:43

## 2025-03-24 RX ADMIN — OXYCODONE 5 MG: 5 TABLET ORAL at 10:12

## 2025-03-24 RX ADMIN — SODIUM CHLORIDE, SODIUM LACTATE, POTASSIUM CHLORIDE, AND CALCIUM CHLORIDE: .6; .31; .03; .02 INJECTION, SOLUTION INTRAVENOUS at 12:46

## 2025-03-24 RX ADMIN — PHENYLEPHRINE HYDROCHLORIDE 0.4 MCG/KG/MIN: 10 INJECTION INTRAVENOUS at 13:05

## 2025-03-24 RX ADMIN — PROPOFOL 100 MG: 10 INJECTION, EMULSION INTRAVENOUS at 12:50

## 2025-03-24 RX ADMIN — DEXAMETHASONE SODIUM PHOSPHATE 10 MG: 10 INJECTION, SOLUTION INTRAMUSCULAR; INTRAVENOUS at 12:50

## 2025-03-24 RX ADMIN — PERFLUTREN 2 ML: 6.52 INJECTION, SUSPENSION INTRAVENOUS at 07:30

## 2025-03-24 RX ADMIN — FENTANYL CITRATE 50 MCG: 50 INJECTION INTRAMUSCULAR; INTRAVENOUS at 12:50

## 2025-03-24 RX ADMIN — SENNOSIDES AND DOCUSATE SODIUM 1 TABLET: 50; 8.6 TABLET ORAL at 20:00

## 2025-03-24 ASSESSMENT — ACTIVITIES OF DAILY LIVING (ADL)
ADLS_ACUITY_SCORE: 62
ADLS_ACUITY_SCORE: 62
ADLS_ACUITY_SCORE: 66
ADLS_ACUITY_SCORE: 64
ADLS_ACUITY_SCORE: 62
ADLS_ACUITY_SCORE: 60
ADLS_ACUITY_SCORE: 64
ADLS_ACUITY_SCORE: 60
ADLS_ACUITY_SCORE: 62
ADLS_ACUITY_SCORE: 64
ADLS_ACUITY_SCORE: 62
ADLS_ACUITY_SCORE: 66
ADLS_ACUITY_SCORE: 60
ADLS_ACUITY_SCORE: 66
ADLS_ACUITY_SCORE: 60
ADLS_ACUITY_SCORE: 64

## 2025-03-24 ASSESSMENT — ENCOUNTER SYMPTOMS: DYSRHYTHMIAS: 1

## 2025-03-24 NOTE — PROGRESS NOTES
Dr. Torres (Cardiology) evaluated patient at bedside.     Patient cleared for surgery     **ADDENDUM 11:05 AM 03/24/25 - Discussed with Dr. Torres around 1110 who stated will complete/sign consultation note within the hour for surgery to proceed.

## 2025-03-24 NOTE — ANESTHESIA PREPROCEDURE EVALUATION
Anesthesia Pre-Procedure Evaluation    Patient: Shaun Berkowitz   MRN: [id number] : 1940        Procedure : Procedure(s):  INTERNAL FIXATION, FRACTURE, TROCHANTERIC, HIP, USING PINS OR RODS, USING HANA TABLE          History reviewed. No pertinent past medical history.   History reviewed. No pertinent surgical history.   No Known Allergies   Social History     Tobacco Use    Smoking status: Not on file    Smokeless tobacco: Not on file   Substance Use Topics    Alcohol use: Not on file      Wt Readings from Last 1 Encounters:   25 72.6 kg (160 lb)        Anesthesia Evaluation   Pt has had prior anesthetic. Type: General.    No history of anesthetic complications       ROS/MED HX  ENT/Pulmonary:       Neurologic:     (+)          CVA,                      Cardiovascular: Comment: TTE:  Interpretation Summary     1. The left ventricle is normal in size with normal left ventricular wall  thickness.  - Left ventricular function is decreased. The ejection fraction is 50-55%  (borderline).  2. The right ventricle is mildly dilated with mildly decreased right  ventricular systolic function  3. Both atria are moderately dilated.  4. The aortic valve is trileaflet with aortic valve sclerosis.  5. There is moderately severe tricuspid valve regurgitation.  6. Right ventricular systolic pressure is elevated (55 mmHg), consistent with  severe pulmonary hypertension.  7 The patient exhibited frequent PVCs.  8. There is no comparison study available.     Hemodynamically significant valve disease is identified. Recommend referral to  valve clinic for further evaluation. Valve clinic phone number: [phone].  [facility] Epic: [facility].      (+)  hypertension- -  CAD -  CABG- -      CHF                  dysrhythmias,   valvular problems/murmurs  Severe TR.   pulmonary hypertension,      METS/Exercise Tolerance:     Hematologic:     (+)      anemia,          Musculoskeletal:   (+)   "arthritis,             GI/Hepatic:       Renal/Genitourinary:    (-) renal disease   Endo:    (-) obesity   Psychiatric/Substance Use:       Infectious Disease:       Malignancy:       Other:            Physical Exam    Airway        Mallampati: III   TM distance: > 3 FB   Neck ROM: full     Respiratory Devices and Support         Dental       (+) Minor Abnormalities - some fillings, tiny chips      Cardiovascular          Rhythm and rate: irregular and normal     Pulmonary           breath sounds clear to auscultation           OUTSIDE LABS:  CBC:   Lab Results   Component Value Date    WBC 8.3 03/23/2025    WBC 6.3 03/07/2024    HGB 12.6 (L) 03/23/2025    HGB 10.5 (L) 03/07/2024    HCT 38.6 (L) 03/23/2025    HCT 34.6 (L) 03/07/2024     03/23/2025     03/07/2024     BMP:   Lab Results   Component Value Date     03/23/2025     02/22/2024    POTASSIUM 5.2 03/23/2025    POTASSIUM 4.5 11/25/2024    CHLORIDE 108 (H) 03/23/2025    CHLORIDE 105 02/22/2024    CO2 22 03/23/2025    CO2 21 (L) 02/22/2024    BUN 26.2 (H) 03/23/2025    BUN 20.0 02/22/2024    CR 0.99 03/23/2025    CR 0.86 02/22/2024     (H) 03/23/2025    GLC 88 02/22/2024     COAGS:   Lab Results   Component Value Date    PTT 28 03/23/2025    INR 0.96 03/23/2025     POC: No results found for: \"BGM\", \"HCG\", \"HCGS\"  HEPATIC:   Lab Results   Component Value Date    ALBUMIN 4.0 03/24/2025    PROTTOTAL 6.4 03/24/2025    ALT 15 03/24/2025    AST 31 03/24/2025    ALKPHOS 101 03/24/2025    BILITOTAL 0.7 03/24/2025     OTHER:   Lab Results   Component Value Date    JAMILA 9.6 03/23/2025    MAG 2.2 03/23/2025       Anesthesia Plan    ASA Status:  4    NPO Status:  NPO Appropriate    Anesthesia Type: General.     - Airway: ETT   Induction: Intravenous.   Maintenance: Balanced.   Techniques and Equipment:     - Airway: Video-Laryngoscope     - Lines/Monitors: 2nd IV     - Drips/Meds: Vasopressin, Dexmed. bolus, Fentanyl " "    Consents    Anesthesia Plan(s) and associated risks, benefits, and realistic alternatives discussed. Questions answered and patient/representative(s) expressed understanding.     - Discussed: Risks, Benefits and Alternatives for BOTH SEDATION and the PROCEDURE were discussed     - Discussed with:             Postoperative Care    Pain management: IV analgesics, Oral pain medications, Peripheral nerve block (Single Shot), Multi-modal analgesia.   PONV prophylaxis: Ondansetron (or other 5HT-3), Dexamethasone or Solumedrol     Comments:    Other Comments: GA with ETT, Glide  2 pIVs  Support with phenyl, vaso boluses as needed  Cleared by cardiology this morning \" Preop cardiovascular clearance: The patient has been doing fine over last several months from a cardiology standpoint.  He denies chest pain, shortness of breath on exertion, palpitations, dizziness.  He has no leg edema.  His lungs are clear.  His CHF is stable.  He has no active chest pain.  ECG showed sinus rhythm with PVCs, but no obvious ischemic ST-T change.  Echocardiogram was reported LVEF of 50 to 55%, better than previous study, perhaps mildly better than previous study in 2021.  Moderate to severe tricuspid valve regurgitation, pulmonary artery hypertension were similar to previous study.  Put altogether, there is no indication of further cardiac evaluation to delay his surgery.  Discussed the risk of of surgery from cardiology standpoint with the patient and his family.  The patient is cleared with a relatively low risk of left hip surgery from cardiology standpoint.  The patient and his family, 1 daughter is a CT tech at [employer], all agreed to pursue surgery\"  Fascia Iliaca block for post op pain control           Malaika Jasso MD    Clinically Significant Risk Factors Present on Admission          # Hyperchloremia: Highest Cl = 108 mmol/L in last 2 days, will monitor as appropriate            # Drug Induced Platelet Defect: home " medication list includes an antiplatelet medication   # Hypertension: Home medication list includes antihypertensive(s)

## 2025-03-24 NOTE — PROGRESS NOTES
Hospitalist follow up note:    Chart reviewed. Pt undergoing medical eval prior to surgery. Slightly hypoxic this morning. Has had persistent bigeminy overnight. Echo is pending. Has hx of ischemic cardiomyopathy, valvular disease. Will check CXR, ask for cards eval prior to surgery.     Roberto Carlos Yates DO   Pager #: [phone]

## 2025-03-24 NOTE — CONSULTS
Please see complete catalog below of history exam and electronic medical records for past medical history, but in brief, Pedro Pablo had a mechanical fall which resulted in significant left hip pain.  He presented to emergency department where radiographic and clinical evaluation did demonstrate a left hip fracture in a intertrochanteric morphology.  Negative workup for pain elsewhere including bilateral upper extremities as well as distal to the left lower extremity or to the right lower extremity.    He is accompanied by his family today and they do note that he does use a walker at baseline given previous injury of pelvic/acetabular injury which was treated with ORIF in the past as well as a left humerus fracture.      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  Otherwise pain to his hip and buttocks region without significant radiating features.  We did discuss the risk benefits alternatives and details of the intramedullary nail ORIF of the right hip fracture as well as the nonoperative and operative treatment modalities available and he is in agreement with pursuing the surgical course.  We did discuss risk including infection neurovascular damage continue wound healing issues scar is range of motion difficulty leg length discrepancy stiffness pain need for repeat surgery dislocation fracture cardiac arrest stroke death bleeding DVT anesthesia complications.  He did understand the above assessment and plan and consent was obtained by him and his family.      Plan to proceed for left hip ORIF IM nail today 3/24 2025.      ORTHOPEDIC CONSULTATION    Consultation  Shaun MAS Woo,  1940, MRN [id number]    [facility]  Hip fracture, left, closed, initial encounter (H) [S72.002A]    PCP: Marcella Patterson, [phone]   Code status:  Full Code       Extended Emergency Contact Information  Primary Emergency Contact: REBA SANCHEZ  Address: [address]  [place] [address] [facility]  Mobile Phone: [phone]  Relation: Daughter  Secondary Emergency Contact: VICKY BERKOWITZ  Mobile Phone: [phone]  Relation: Daughter         IMPRESSION:  left intertrochanteric femur fracture after a fall at his care facility      PLAN:  This patient was discussed with Dr. Correa and Luda, on-call surgeon for [facility] and they are in agreement with the following plan.     - NPO surgery potentially at 1210  - Needs Cardiology clearance prior to surgery  - bedrest until surgery  - Pain Control    Thank you for including [facility] in the care of Shaun Berkowitz. It has been a pleasure participating in Pedro Pablos care.        CHIEF COMPLAINT: <principal problem not specified>    HISTORY OF PRESENT ILLNESS:  The patient is seen in orthopedic consultation at the request of Dr. Yates.  The patient is a 84 year old male with moderate pain of the left  hip. Patient notes a mechanical fall, now having left hip pain. Denies hitting his head or losing consciousness. No other complaints of injury or pain. Reports that he was moving laundry over from the washer and dryer lost his balance and fell onto the left hip.     He presented emergency department where radiographs demonstrated a left intertrochanteric hip fracture.    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 No pertinent past medical history.       ALLERGIES:   Review of patient's allergies indicates No Known Allergies      MEDICATIONS UPON ADMISSION:  Medications were reviewed.  They include:   Medications Prior to Admission   Medication Sig Dispense Refill Last Dose/Taking    acetaminophen (TYLENOL) 500 MG tablet Take 1,000 mg by mouth 3 times daily.   3/23/2025 Noon    amLODIPine (NORVASC) 10 MG tablet Take  10 mg by mouth daily.   3/23/2025 Morning    aspirin 81 MG EC tablet Take 81 mg by mouth daily.   3/23/2025 Morning    atorvastatin (LIPITOR) 80 MG tablet Take 80 mg by mouth at bedtime.   3/22/2025 Bedtime    dimethicone-zinc oxide (AMADO PROTECT) external cream Apply topically daily as needed for dry skin or other. To intact stage 1 pressure area and or irritated skin, rash, or excoriation.   Unknown    ibuprofen (ADVIL/MOTRIN) 400 MG tablet Take 400 mg by mouth every 6 hours as needed (pain).   Unknown    loperamide (IMODIUM A-D) 2 MG tablet Take 2 mg by mouth 4 times daily as needed for diarrhea. Take 2 tabs (4 mg) at first loose stool, then 1 tab (2 mg) as needed after each subsequent loose stool.   Unknown    losartan (COZAAR) 25 MG tablet Take 25 mg by mouth daily.   3/23/2025 Morning    metoprolol succinate ER (TOPROL XL) 25 MG 24 hr tablet Take 25 mg by mouth 2 times daily.   3/23/2025 Morning    mirtazapine (REMERON) 15 MG tablet Take 15 mg by mouth at bedtime.   3/22/2025 Bedtime    omeprazole (PRILOSEC) 20 MG DR capsule Take 20 mg by mouth every evening.   3/22/2025 Evening    polyethylene glycol (MIRALAX) 17 g packet Take 1 packet by mouth daily.   3/23/2025 Morning    polyethylene glycol (MIRALAX) 17 g packet Take 1 packet by mouth daily as needed for constipation.   Unknown    pregabalin (LYRICA) 75 MG capsule Take 75 mg by mouth 2 times daily.   3/23/2025 Morning    vitamin D2 (ERGOCALCIFEROL) 87851 units (1250 mcg) capsule Take 50,000 Units by mouth once a week. Saturdays   3/22/2025         SOCIAL HISTORY:   he      FAMILY HISTORY:  family history is not on file.      REVIEW OF SYSTEMS:   Reviewed with patient. See HPI, otherwise negative       PHYSICAL EXAMINATION:  Vitals: Temp:  [97.5  F (36.4  C)-98.2  F (36.8  C)] 98.2  F (36.8  C)  Pulse:  [72-82] 73  Resp:  [16-20] 17  BP: (138-184)/(67-92) 158/80  SpO2:  [90 %-94 %] 91 %  General: On examination, the patient is resting comfortably, NAD,  awake, and alert and oriented to person, place, time, and, and general circumstances   SKIN: There is a shortened and externally rotated left hip, no significant ecchymosis or no significant laceration  Pulses:  dorsalis pedis and posterior tibial pulse is intact and equal bilaterally  Sensation: deficient to the distal lower extremities due to neuropathy diffusely distal thigh, leg, foot  Tenderness: exquisite tenderness to the left lateral hip, no significant tenderness to palpation distal femur knee tib-fib ankle foot  ROM: able to plantar and dorsi flex without difficulty, weakly wiggles toes  Motor: 5/5 ankle plantar dorsiflexion  Contralateral side= Full range of motion, Negative joint instability findings, 5/5 motor groups about the joint, Non-tender.       RADIOGRAPHIC EVALUATION:  Personally reviewed does demonstrate a left intertrochanteric hip fracture with some mild subtrochanteric extension as well as sequelae of a distal femoral traction pin site from previous injury in the past.    PERTINENT LABS:  Lab Results: personally reviewed.   @BRIEFLAB[NA,K,CL,CO2,BUN,CREATININE,GLUCOSE,GLUCOSE @  Lab Results   Component Value Date    WBC 8.3 03/23/2025    HGB 12.6 03/23/2025    HCT 38.6 03/23/2025     03/23/2025     03/23/2025       Clinically Significant Risk Factors Present on Admission      # Drug Induced Platelet Defect: home medication list includes an antiplatelet medication         Risk Factors for Delirium:     #Age: >80      Crissy Lane PA-C, MARIZA and Aaron Lares MD    Date: 3/24/2025  Time: 8:50 AM    CC1:   Roberto Carlos Yates DO    CC2:   Marcella Patterson

## 2025-03-24 NOTE — PROGRESS NOTES
Report given to [facility], RN. Patient to be transported to [id number] via bed with peripheral IV saline locked and family present.

## 2025-03-24 NOTE — ANESTHESIA POSTPROCEDURE EVALUATION
Patient: Shaun Berkowitz    Procedure: Procedure(s):  INTERNAL FIXATION, FRACTURE, TROCHANTERIC, HIP, USING PINS OR RODS, USING HANA TABLE       Anesthesia Type:  General    Note:  Disposition: Admission   Postop Pain Control: Uneventful            Sign Out: Well controlled pain   PONV: No   Neuro/Psych: Uneventful            Sign Out: Acceptable/Baseline neuro status   Airway/Respiratory: Uneventful            Sign Out: Acceptable/Baseline resp. status   CV/Hemodynamics: Uneventful            Sign Out: Acceptable CV status; No obvious hypovolemia; No obvious fluid overload   Other NRE: NONE   DID A NON-ROUTINE EVENT OCCUR? No           Last vitals:  Vitals Value Taken Time   /65 03/24/25 1551   Temp 36.5  C (97.7  F) 03/24/25 1550   Pulse 71 03/24/25 1556   Resp 14 03/24/25 1556   SpO2 95 % 03/24/25 1604   Vitals shown include unfiled device data.    Electronically Signed By: Malaika Jasso MD  March 24, 2025  4:28 PM

## 2025-03-24 NOTE — ED NOTES
"[facility] ED Handoff Report    ED Chief Complaint: Left hip fracture    ED Diagnosis:  (S72.002A) Hip fracture, left, closed, initial encounter (H)  Comment: strict bedrest  Plan: Case Request: INTERNAL FIXATION, FRACTURE,         TROCHANTERIC, HIP, USING PINS OR RODS, USING         HANA TABLE        Surgery tomorrow       PMH:  No past medical history on file.     Code Status:  Full Code     Falls Risk: Yes Band: Applied    Current Living Situation/Residence: lives in an apartment     Elimination Status: Continent: Yes     Activity Level: Total assist/lift    Patient's Preferred Language:  English     Needed: No    Vital Signs:  BP (!) 184/92   Pulse 72   Temp 97.5  F (36.4  C) (Oral)   Resp 17   Ht 1.727 m (5' 8\")   Wt 72.6 kg (160 lb)   SpO2 94%   BMI 24.33 kg/m       Cardiac Rhythm: NA    Pain Score: 10/10    Is the Patient Confused:  No    Last Food or Drink: 03/23/25 at 1200    Assessment and Plan of Care:  Left hip fracture. Gave Tylenol for pain. 20G IV L forearm. Surgery tomorrow. Strict bedrest.      Tests Performed: Done: Labs and Imaging    Treatments Provided:  Gave Tylenol for pain, strict bedrest    Family Dynamics/Concerns: No    Belongings Checklist Done and Signed by Patient: Yes    Belongings Sent with Patient: yes    Boarding medications sent with patient: no    Additional Information: History of a stroke, sometimes need questions repeated.     RN: Zayda De La Torre RN   3/23/2025 7:07 PM       " Statement Selected

## 2025-03-24 NOTE — PROGRESS NOTES
[facility] Medicine PROGRESS NOTE      Identification/Summary:   84-year-old male presented to the emergency department after a fall.  Found to have a left hip fracture.  Also has a medical history of coronary disease, ischemic cardiomyopathy.  He had preop evaluation also showed bigeminy, hypoxia on room air, EKG suggestive of pulmonary vascular congestion.  Echocardiogram ordered and cardiology consult ordered to evaluate preoperative risk reduction.  Echocardiogram with EF of 50 to 55%, biatrial dilatation, moderate to severe tricuspid regurgitation, severe pulmonary hypertension.  BNP elevated at 4000.    Taken to the operating room for hemiarthroplasty on 3/24.    Assessment and Plan:  Left hip femoral neck fracture  Appreciate orthopedic recommendations  Going to surgery today  Postoperative pain control, DVT prophylaxis per orthopedic surgery    Coronary artery disease, distant history of CABG  Chronic congestive heart failure with relatively preserved ejection fraction  Severe pulmonary hypertension  Severe tricuspid regurgitation  Essential hypertension  Persistent ventricular bigeminy  History of stroke, on aspirin and statin chronically  Appreciate cardiology evaluation  Cautious with perioperative IV fluids  Watch for worsening heart failure in the perioperative period  Continue metoprolol, losartan, amlodipine    Mild room air hypoxia  Oxygen saturation of 90% at rest on room air  Encourage incentive spirometry  Chest x-ray without obvious pneumonia, did show evidence of vascular congestion  Supplemental oxygen as needed  IV diuresis if O2 needs worsening    Chronic macrocytic anemia  Repeat hemoglobin tomorrow  Further evaluation and treatment as an outpatient    Insomnia  Takes Remeron at bedtime    Chronic pain  Takes Lyrica 75 mg twice daily    Diet: NPO for Procedure/Surgery per Anesthesia Guidelines Except for: Meds; Clear liquids before procedure/surgery: ADULT (Age GREATER than or Equal  to 18 years) - Clear liquids 2 hours before procedure/surgery  Fluids: None  Pain meds: Tylenol and oxycodone  Therapy: PT and OT  DVT Prophylaxis: Postoperatively per orthopedic surgery  Code Status: Full Code  Medically Ready for Discharge: Anticipated in 2-4 Days        Interval History/Subjective:  Says that he has a mild chronic cough.  He also has some shortness of breath with exertion.  Not able to climb a flight of stairs.  Typically ambulates with a walker.  No recent chest pain, nausea, vomiting.  Has not noticed any significant lower extremity edema lately.  Denies dysuria.    Physical Exam/Objective:  Gen: no acute distress, comfortable  ENT: no scleral icterus  Pulm: lungs are diminished at bases bilaterally, no wheezing, breathing comfortably at rest on room air, occasional dry cough  CV: Irregularly irregular rhythm, minimal lower extremity edema  GI: abdomen is soft, non-tender, non-distended with active bowel sounds.  Derm: Warm, dry, not pale  Psych: appropriate affect    Medications:   Current Facility-Administered Medications   Medication Dose Route Frequency Provider Last Rate Last Admin    [Auto Hold] acetaminophen (TYLENOL) tablet 1,000 mg  1,000 mg Oral TID Kya Ramos MD   1,000 mg at 03/24/25 0755    [Auto Hold] amLODIPine (NORVASC) tablet 10 mg  10 mg Oral Daily Kya Ramos MD   10 mg at 03/24/25 0755    [Held by provider] aspirin EC tablet 81 mg  81 mg Oral Daily Kya Ramos MD        [Auto Hold] atorvastatin (LIPITOR) tablet 80 mg  80 mg Oral At Bedtime Kya Ramos MD   80 mg at 03/23/25 2228    ceFAZolin (ANCEF) 2 g in dextrose 50 mL intermittent infusion  2 g Intravenous See Admin Instructions Nestor Correa MD        [Held by provider] losartan (COZAAR) tablet 25 mg  25 mg Oral Daily Kya Ramos MD        [Auto Hold] metoprolol succinate ER (TOPROL XL) 24 hr tablet 25 mg  25 mg Oral BID Kya Ramos MD   25 mg at 03/24/25 0755    [Auto Hold] mirtazapine (REMERON) tablet 15  mg  15 mg Oral At Bedtime Kya Ramos MD   15 mg at 03/23/25 2228    [Auto Hold] pantoprazole (PROTONIX) EC tablet 40 mg  40 mg Oral QPM Roberto Carlos Yates DO   40 mg at 03/23/25 1926    [Auto Hold] polyethylene glycol (MIRALAX) Packet 17 g  17 g Oral BID Kya Ramos MD   17 g at 03/24/25 0755    [Auto Hold] pregabalin (LYRICA) capsule 75 mg  75 mg Oral BID Kya Ramos MD   75 mg at 03/24/25 1014    sodium chloride (PF) 0.9% PF flush 3 mL  3 mL Intracatheter Q8H Malaika Jasso MD        sodium chloride (PF) 0.9% PF flush 3 mL  3 mL Intracatheter Q8H [facility] Malaika Jasso MD        [Auto Hold] sodium chloride (PF) 0.9% PF flush 3 mL  3 mL Intracatheter Q8H Kya Ramos MD   3 mL at 03/24/25 1012    [Auto Hold] vitamin D2 (ERGOCALCIFEROL) 63113 units (1250 mcg) capsule 50,000 Units  50,000 Units Oral Weekly Kya Ramos MD         Clinically Significant Risk Factors Present on Admission          # Hyperchloremia: Highest Cl = 108 mmol/L in last 2 days, will monitor as appropriate            # Drug Induced Platelet Defect: home medication list includes an antiplatelet medication   # Hypertension: Home medication list includes antihypertensive(s)                         Roberto Carlos Yates DO   Hospitalist  [facility]

## 2025-03-24 NOTE — PLAN OF CARE
Goal Outcome Evaluation:       A&O, VSS, Ice and PRN oxycodone and IV dilaudid given overnight. Q2 turns. Skin check done with Michaela SWEENEY. Tele is NS with frequent sporadic PVC's to bigeminy PVC occurrence.                  Problem: Pain Acute  Goal: Optimal Pain Control and Function  Outcome: Progressing  Intervention: Develop Pain Management Plan  Recent Flowsheet Documentation  Taken 3/23/2025 2254 by Yanet De La Paz RN  Pain Management Interventions: medication (see MAR)  Taken 3/23/2025 2130 by aYnet De La Paz RN  Pain Management Interventions:   MD notified (comment)   cold applied  Taken 3/23/2025 2037 by Yanet De La Paz RN  Pain Management Interventions: medication (see MAR)  Intervention: Prevent or Manage Pain  Recent Flowsheet Documentation  Taken 3/23/2025 2358 by Yanet De La Paz RN  Sensory Stimulation Regulation:   care clustered   lighting decreased  Sleep/Rest Enhancement:   awakenings minimized   comfort measures   room darkened  Taken 3/23/2025 2042 by Yanet De La Paz RN  Sensory Stimulation Regulation:   care clustered   lighting decreased  Sleep/Rest Enhancement:   awakenings minimized   comfort measures   room darkened

## 2025-03-24 NOTE — PROGRESS NOTES
This nurse called the daughter about patient being confused. She is stating that he usually knows his name and his birthday. This nurse reassured the daughter that it could be the anesthesia that is making him confused. Dr. Jasso MDA made aware of those findings and she feels comfortable sending him to the floor.

## 2025-03-24 NOTE — ANESTHESIA CARE TRANSFER NOTE
Patient: Shaun Berkowitz    Procedure: Procedure(s):  INTERNAL FIXATION, FRACTURE, TROCHANTERIC, HIP, USING PINS OR RODS, USING HANA TABLE       Diagnosis: Hip fracture, left, closed, initial encounter (H) [S72.002A]  Diagnosis Additional Information: No value filed.    Anesthesia Type:   General     Note:    Oropharynx: oropharynx clear of all foreign objects  Level of Consciousness: drowsy  Oxygen Supplementation: face mask  Level of Supplemental Oxygen (L/min / FiO2): 6  Independent Airway: airway patency satisfactory and stable  Dentition: dentition unchanged  Vital Signs Stable: post-procedure vital signs reviewed and stable  Report to RN Given: handoff report given  Patient transferred to: PACU    Handoff Report: Identifed the Patient, Identified the Reponsible Provider, Reviewed the pertinent medical history, Discussed the surgical course, Reviewed Intra-OP anesthesia mangement and issues during anesthesia, Set expectations for post-procedure period and Allowed opportunity for questions and acknowledgement of understanding      Vitals:  Vitals Value Taken Time   /74 03/24/25 1442   Temp     Pulse 72 03/24/25 1443   Resp 13 03/24/25 1443   SpO2 100 % 03/24/25 1443   Vitals shown include unfiled device data.    Electronically Signed By: MARIO Lewis CRNA  March 24, 2025  2:45 PM

## 2025-03-24 NOTE — OP NOTE
Operative Report     PATIENT Shaun Berkowitz     DATE OF SURGERY:  3/25/2025     PREOPERATIVE DIAGNOSIS   Closed comminuted displaced fracture of Intertrochanteric region of LEFT femur      POSTOPERATIVE DIAGNOSIS   Closed comminuted displaced fracture of Intertrochanteric region of LEFT femur      PROCEDURE PERFORMED   LEFT hip trochanteric nailing with cephalomedullary device     IMPLANTS     Implant Name Type Inv. Item Serial No.  Lot No. LRB No. Used Action   Short Trochanteric Nail      Left 1 Implanted   Telescoping Lag Screw, Left Threaded   [id number]   Left 1 Implanted   Corical Screw      Left 1 Implanted           SURGEON  Aaron Lares MD     ASSISTANT   JOSE Soto     A skilled assistant was required for patient positioning, surgical assistance, wound closure and monitoring patient's safety throughout the case.        ANESTHESIA  Choice      FINDINGS:  Displaced Intertrochanteric femur fracture with notable comminution and involvement of lesser trochanter      SPECIMENS:  none     ESTIMATED BLOOD LOSS:  75 ml (including fracture hematoma)     COMPLICATIONS   None.          INDICATION FOR PROCEDURE  Pedro Pablo is a pleasant 84 year old male who sustained a LEFT intertrochanteric hip fracture after a mechanical fall. The risks, benefits and expected outcomes of the procedure were discussed with the patient and his family (Fe -daughter).  Questions were answered fully.  The patient and family gave their consent and the consent form was signed prior to the procedure.      PREOPERATIVE EXAMINATION:   LEFT hip: A&Ox3  Moderate hematoma, mild ecchymosis, swelling to lateral left hip/thigh  TTP of lateral hip / proximal thigh, no significant pain distal femur/knee/tib/fib nor to ankle/foot  Leg shortened and externally rotated   Neurovascuarily intact diffusely to baseline with diffuse neuropathy at baseline with diminished sensation of distal thigh/leg/foot/toes and motor intact to  all nerve distributions (able to weakly ankle plantar/dorsiflex, EHL/FHL)   Brisk cap refill distally      INFORMED CONSENT  Pedro Pablo was identified in the preoperative holding area and was identified using medical record number, name, and date of birth, all of which were confirmed. The operative hip was marked using an indelible marker. Once again, all risks and benefits as well as alternatives to surgical intervention were discussed with the patient in detail and all their questions were answered. Risks discussed included but were not limited to: persistent pain, infection, bleeding, scarring, stiffness, thromboembolic events, fracture, malalignment/malrotation, malunion/nonunion, implant complications, severe limb dysfunction, loss of limb, and loss of life.  He did discuss given his significant cardiac history that he does have a high risk of complications due to this which he understands.  The patient signed informed consent and wished to proceed with surgery as scheduled.      PROCEDURE IN DETAIL:  Prior to prepping and draping the c-arm was brought in and AP and lateral fluoroscopic images were obtained were again appreciated intertrochanteric fracture.  We then were able to achieve reduction with slight internal rotation, traction, and flexion.  The leg also positioned in slight adduction internal neutral to facilitate easy passage of the nail using the [place] table with the fracture and femoral contour of the cortices to remain reduced without any migration or displacement of the fracture with satisfactory reduction and alignment.       The operative site was then prepped and draped in usual sterile fashion. A final timeout was then performed confirming patient, procedure, laterality, and surgical site. All in the room agreed.     A 3 cm incision was made just proximal to the greater trochanter in line with the femur. We encountered hematoma that was coagulated and evacuated with manual massage to decompress the  lateral hip to good satisfaction. Sharp dissection was made down through the gluteal fascia followed by blunt finger dissection down to the tip of the remaining greater trochanter. The abductors were palpated and protected. The guidepin was placed in the correct start point and confirmed on AP and Lateral fluoroscopic images in center center position. This was then advanced into the proximal femur and again checked on AP and lateral images. The opening reamer was used to open the proximal femur.  It's position was confirmed with AP and lateral fluoroscopic images.     Next, the 125 degree intramedullary nail 11 mm x 200 mm was opened to the back table, assembled to the aiming jig and then carefully inserted into the proximal femur. This was malleted into place checking implant position and fracture reduction on AP and lateral fluoroscopic images. Once in the desired position, a small incision was made laterally to allow for the aiming sleeve for the cephalomedullary screw to be placed onto the lateral femur. A guide pin was placed into the femoral neck and head through the nail and checked on AP and lateral fluoroscopic imaging. Once confirming position, this was measured, overdrilled and a 10.5 mm x 100 mm cephalomedullary lag screw was placed with continued reduction appreciated with the compression aiming arm systematically tightening and visualized under fluoroscopic imaging.  There was no significant diastasis of the fracture as we placed the Left hand tightened lag screw on the lateral view and satisfactory reduction and nail with screw placement on additional views. The Arthrex activation telescoping sleeve was remained in place given fracture compression already achieved and consideration to slight subtrochanteric extension with some comminution to fracture morphology.     The distal interlocking static screw was then placed through the aiming guide.  After aligning her fluoroscopy in the appropriate  position, we made a small lateral incision with palpation of a Schnidt down to bone.  We then passed a drill and appropriate aligned this on fluoroscopy and drilled bicortical as confirmed on fluoroscopy and measured our screw to the appropriate length which was determined to be a 5 mm x 38 mm screw.     The aiming guide was then disassembled and final AP and lateral fluoroscopic images were obtained of the femur confirming implant(s) position and fracture reduction.     The wounds were then copiously irrigated with normal saline. The deep fascia was closed with 0 Vicryl. Deep dermal closed with 2-0 Vicryl. Skin closed 3-0 Monocryl followed by skin glue and steristrips. Sterile Mepilex dressings were placed.     The drapes were then taken down and the patient was placed supine. The patient's lower extremities were warm and well perfused with brisk capillary refill and palpable pulses.  Final sponge and needle counts were correct x2.      The patient was then awoken, transferred to the hospital cart and taken to the PACU in stable condition. The patient tolerated the procedure well.     I did review the case and images with the family (two daughters - Fe and Maribell Santillan who works at [employer] and her  Pedro Pablo's grandson Jorge A, and the wife) with all questions answered.      POSTOPERATIVE PLAN:     Pain Control: Continue per multimodal pain protocol. Minimize opioid pain medications as able. (OK for NSAIDs from ortho perspective)  Weight Bearing: LLE Weight bearing as tolerated with assistive devices (I.e. walker) as appropriate per PT/OT  Closure: Monocryl resorbable sutures, skin glue   Dressings: Mepilex dressing x 2 weeks (may replace as needed if saturated serosanguinous, may shower/get dressings wet after 72 hrs)  Precautions: None  DVT Prophylaxis: Recommend resuming baseline ASA 81 mg for DVT ppx (defer to Hospital Medicine/Cardiology). Early ambulation and SCDs while in bed.  Antibiotics: 24  hours of perioperative antibiotics  Diet: Advance diet as tolerated from orthopedic perspective  GI: Plan for aggressive bowel regimen to prevent constipation from opioid medications  Lines: HLIV once tolerating PO  PT/OT: Eval and treatment. Will follow up on recommendations.  Follow up:  2 weeks with Dr. Lares  for wound check and XR for Left hip ( [facility] Direct Phone Line)   Referral to Dr. Mario for bone health evaluation.  Discharge plan: Pending PT/OT evaluation and ongoing workup by medical team     Family will call with any questions as well as to inform us if we have any issues with follow up appointment.      Aaron Lares MD  Orthopedic Surgery  [facility]

## 2025-03-24 NOTE — PROVIDER NOTIFICATION
"Notified Dr. Yates at 5:56 PM 03/24/25 r/t RN attempting intermittent catheterization for UA collection / bladder scan of 424 ml and patient unable to void --> RN attempted 16F straight tip intermittent cath and met resistance/mass and noted approx 30 ml of dark red output. Unable to advance catheter into bladder for urine returns.     Discussed with Dr. Yates and new orders:    - Scheduled Tamsulosin (refer MAR)   - Insert indwelling guevara catheter   - Consult Urology if unable to insert guevara catheter       **ADDENDUM 6:33 PM 03/24/25 - 2 RN's attempted 16F Coude Guevara insertion and unable to advance catheter into bladder due to resistance/mass. \"ASAP\" Urology consult order placed.   "

## 2025-03-24 NOTE — PROGRESS NOTES
Patient vital signs are at baseline: No,  Reason:  Weaning supplemental oxygen to maintain sat > 90% at rest.   Patient able to ambulate as they were prior to admission or with assist devices provided by therapies during their stay:  No,  Reason:  Due to ambulate when able   Patient MUST void prior to discharge:  No,  Reason:  Due to void - attempted intermittent cath + guevara insertion but failed. Urology consulted.   Patient able to tolerate oral intake:  Yes but poor appetite currently. Adequate fluid intake but poor food intake.   Pain has adequate pain control using Oral analgesics:  Yes  Does patient have an identified :  Yes  Has goal D/C date and time been discussed with patient:  Yes - likely TCU pending progression

## 2025-03-24 NOTE — ANESTHESIA PROCEDURE NOTES
Airway       Patient location during procedure: OR       Procedure Start/Stop Times: 3/24/2025 12:51 PM  Staff -        CRNA: Juan Mcgarry APRN CRNA       Performed By: CRNA  Consent for Airway        Urgency: elective  Indications and Patient Condition       Indications for airway management: devon-procedural       Induction type:intravenous       Mask difficulty assessment: 1 - vent by mask    Final Airway Details       Final airway type: endotracheal airway       Successful airway: ETT - single  Endotracheal Airway Details        ETT size (mm): 8.0       Cuffed: yes       Successful intubation technique: direct laryngoscopy       DL Blade Type: Smith 2       Grade View of Cords: 1       Adjucts: stylet       Position: Right       Measured from: lips       Secured at (cm): 22       Bite block used: None    Post intubation assessment        Placement verified by: capnometry, equal breath sounds and chest rise        Number of attempts at approach: 1       Number of other approaches attempted: 0       Secured with: tape       Ease of procedure: easy       Dentition: Intact and Unchanged    Medication(s) Administered   Medication Administration Time: 3/24/2025 12:51 PM

## 2025-03-24 NOTE — ANESTHESIA PROCEDURE NOTES
"Fascia iliaca Procedure Note    Pre-Procedure   Staff -        Anesthesiologist:  Malaika Jasso MD       Performed By: anesthesiologist       Location: OR       Procedure Start/Stop Times: 3/24/2025 12:10 PM and 3/24/2025 12:15 PM       Pre-Anesthestic Checklist: patient identified, IV checked, site marked, risks and benefits discussed, informed consent, monitors and equipment checked, pre-op evaluation, at physician/surgeon's request and post-op pain management  Timeout:       Correct Patient: Yes        Correct Procedure: Yes        Correct Site: Yes        Correct Position: Yes        Correct Laterality: Yes        Site Marked: Yes  Procedure Documentation  Procedure: Fascia iliaca         Laterality: left       Patient Position: supine       Skin prep: Chlorapreplower extremity plane block       Needle Type: short bevel       Needle Gauge: 21.        Needle Length (Inches): 4        1. Ultrasound was used to identify targeted nerve, plexus, vascular marker, or fascial plane and place a needle adjacent to it in real-time.       2. Ultrasound was used to visualize the spread of anesthetic in close proximity to the above referenced structure.       3. A permanent image is entered into the patient's record.       4. The visualized anatomic structures appeared normal.       5. There were no apparent abnormal pathologic findings.    Assessment/Narrative         The placement was negative for: painful injection and site bleeding       Paresthesias: No.       Bolus given via needle. no blood aspirated via catheter.        Secured via.        Insertion/Infusion Method: Single Shot       Complications: none    Medication(s) Administered   Bupivacaine 0.25% PF (Infiltration) - Infiltration   30 mL - 3/24/2025 12:10:00 PM  Medication Administration Time: 3/24/2025 12:10 PM      FOR [facility] ([facility]) ONLY:   Pain Team Contact information: please page the Pain Team Via [employer]. Search \"Pain\". During daytime hours, please " page the attending first. At night please page the resident first.

## 2025-03-24 NOTE — H&P
Pedro Pablo was seen and evaluated in the preoperative holding area.  Patient does have Left hip fracture (intertrochanteric).  Dicussed risks and benefits of surgical intervention.  Recommending Left hip ORIF with IMN for treatment.  Patient and daughter, Fe, are interested in proceeding. Informed consent was signed in preoperative holding area.  Plan to proceed on 3/24/2025.     Aaron Lares MD  [facility]

## 2025-03-24 NOTE — CONSULTS
Thank you,   , for asking the [facility] team to see Mr. Shaun Berkowitz for evaluation of preop cardiovascular clearance.      Assessment/Recommendations   Assessment/Plan:  Preop cardiovascular clearance: The patient has been doing fine over last several months from a cardiology standpoint.  He denies chest pain, shortness of breath on exertion, palpitations, dizziness.  He has no leg edema.  His lungs are clear.  His CHF is stable.  He has no active chest pain.  ECG showed sinus rhythm with PVCs, but no obvious ischemic ST-T change.  Echocardiogram was reported LVEF of 50 to 55%, better than previous study, perhaps mildly better than previous study in 2021.  Moderate to severe tricuspid valve regurgitation, pulmonary artery hypertension were similar to previous study.  Put altogether, there is no indication of further cardiac evaluation to delay his surgery.  Discussed the risk of of surgery from cardiology standpoint with the patient and his family.  The patient is cleared with a relatively low risk of left hip surgery from cardiology standpoint.  The patient and his family, 1 daughter is a CT tech at [employer], all agreed to pursue surgery and understand possible risk such as acute on chronic congestive heart failure.  Questions are answered.  Coronary artery disease status post CABG in 1999: No chest pain.  Echo as mentioned above.  Continue aspirin, atorvastatin, metoprolol succinate.  Chronic congestive heart failure with preserved ejection fraction, secondary pulmonary hypertension: The patient has no signs of fluid retention.  It is noticed that his proBNP is elevated.  His lungs clear, no leg edema, no complaints of shortness of breath.  Avoid fluid overload during surgery.  Continue metoprolol succinate, losartan.  Diuresis post the surgery as indicated and as needed.  Frequent PVC, bigeminy pattern: His PVC did not reduce LV systolic function.  No palpitations.  Continue  to monitor.  Continue metoprolol succinate 25 mg twice a day.  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  Continue monitor and conservative management at this time.  Benign essential hypertension: His blood pressure is mildly elevated which could be related to left hip pain.  Continue to monitor.  The patient is on losartan and metoprolol succinate.  Dyslipidemia: Continue atorvastatin.  Left hip femoral fracture, other chronic medical disease: Please see other team management for details.    Clinically Significant Risk Factors Present on Admission          # Hyperchloremia: Highest Cl = 108 mmol/L in last 2 days, will monitor as appropriate            # Drug Induced Platelet Defect: home medication list includes an antiplatelet medication   # Hypertension: Home medication list includes antihypertensive(s)                    History of Present Illness/Subjective    It is my pleasure to see Shaun Berkowitz at [facility] for evaluation of pre-op cardiovascular clearance.  Shaun Berkowitz is a 84 year old male with a medical history of coronary artery disease status post CABG in 1999, ischemic cardiomyopathy, LVEF of 50%, chronic congestive heart failure with preserved ejection fraction, secondary pulmonary hypertension due to left ventricular heart failure, benign essential hypertension, dyslipidemia, history of stroke with right-sided weakness.    The patient was admitted to hospital with left hip pain after a mechanical fall.  He is diagnosed with acute displaced left proximal femoral fracture.  He is going to have a surgical treatment this afternoon.  Cardiology service is requested for preop cardiovascular evaluation.    The patient, his wife and several daughters in his room.  The patient states that he had no chest pain, shortness of breath on exertion, palpitations, dizziness, orthopnea,  "PND or leg edema.  From a heart standpoint he has been doing pretty good over the last several months.  His weight has been stable.    His ECG showed sinus arrhythmia with frequent PVCs, bigeminy pattern, no obvious ischemic or ST-T change.  The patient had no palpitations as mentioned above.  Echocardiogram today was reported normal left ventricular size, no segmental wall motion abnormality, LVEF of 50 to 55%, mild right ventricular dilation with mildly reduced right ventricular systolic function, moderately severe tricuspid valve regurgitation, pulmonary artery systolic pressure of 55 mmHg.       Physical Examination Review of Systems   BP (!) 142/66 (BP Location: Right arm, Patient Position: Semi-Gordon's, Cuff Size: Adult Regular)   Pulse 87   Temp 97.9  F (36.6  C) (Oral)   Resp 16   Ht 1.727 m (5' 8\")   Wt 72.6 kg (160 lb)   SpO2 (!) 91%   BMI 24.33 kg/m    Body mass index is 24.33 kg/m .  Wt Readings from Last 3 Encounters:   03/23/25 72.6 kg (160 lb)   11/25/24 74.4 kg (164 lb)       Intake/Output Summary (Last 24 hours) at 3/24/2025 1116  Last data filed at 3/24/2025 1000  Gross per 24 hour   Intake --   Output 250 ml   Net -250 ml       General Appearance:   Awake, Alert, No acute distress.   HEENT:  No scleral icterus; the mucous membranes were moist.   Neck: No cervical bruits. No JVD. No thyromegaly.    Chest: The spine was straight. The chest was symmetric.   Lungs:   Respirations unlabored; Lungs are clear to auscultation. No crackles.  No wheezing.   Cardiovascular:   Regular rhythm and rate, normal first and second heart sounds with no murmurs. No rubs or gallops.    Abdomen:  Soft. No tenderness. Bowels sounds are present   Extremities: Equal tibial pulses. No leg edema.   Skin: No rashes. Warm, Dry.   Musculoskeletal: No tenderness.   Neurologic: Oriented x 3. Mood and affect are appropriate.     A 12 point comprehensive review of systems was negative except as noted.        Medical " History  Surgical History Family History Social History   History reviewed. No pertinent past medical history. History reviewed. No pertinent surgical history. Reviewed: Not contributory to this 84 years old pleasant gentleman. Social History     Socioeconomic History    Marital status:      Spouse name: Not on file    Number of children: Not on file    Years of education: Not on file    Highest education level: Not on file   Occupational History    Not on file   Tobacco Use    Smoking status: Not on file    Smokeless tobacco: Not on file   Substance and Sexual Activity    Alcohol use: Not on file    Drug use: Not on file    Sexual activity: Not on file   Other Topics Concern    Not on file   Social History Narrative    Not on file     Social Drivers of Health     Financial Resource Strain: Low Risk  (3/23/2025)    Financial Resource Strain     Within the past 12 months, have you or your family members you live with been unable to get utilities (heat, electricity) when it was really needed?: No   Food Insecurity: Low Risk  (3/23/2025)    Food Insecurity     Within the past 12 months, did you worry that your food would run out before you got money to buy more?: No     Within the past 12 months, did the food you bought just not last and you didn t have money to get more?: No   Transportation Needs: Low Risk  (3/23/2025)    Transportation Needs     Within the past 12 months, has lack of transportation kept you from medical appointments, getting your medicines, non-medical meetings or appointments, work, or from getting things that you need?: No   Physical Activity: Insufficiently Active (7/31/2023)    Received from [facility]    Exercise Vital Sign     Days of Exercise per Week: 7 days     Minutes of Exercise per Session: 10 min   Stress: No Stress Concern Present (7/31/2023)    Received from [facility] [place] [facility] [employer]  Health - Occupational Stress Questionnaire     Feeling of Stress : Not at all   Social Connections: Moderately Isolated (7/31/2023)    Received from [facility]    Social Connection and Isolation Panel [NHANES]     Frequency of Communication with Friends and Family: More than three times a week     Frequency of Social Gatherings with Friends and Family: More than three times a week     Attends Mor[employer] Services: Never     Active Member of Clubs or Organizations: No     Attends Club or Organization Meetings: Never     Marital Status:    Interpersonal Safety: Low Risk  (3/23/2025)    Interpersonal Safety     Do you feel physically and emotionally safe where you currently live?: Yes     Within the past 12 months, have you been hit, slapped, kicked or otherwise physically hurt by someone?: No     Within the past 12 months, have you been humiliated or emotionally abused in other ways by your partner or ex-partner?: No   Housing Stability: Low Risk  (3/23/2025)    Housing Stability     Do you have housing? : Yes     Are you worried about losing your housing?: No          Medications  Allergies   Scheduled Meds:  Current Facility-Administered Medications   Medication Dose Route Frequency Provider Last Rate Last Admin    acetaminophen (TYLENOL) tablet 1,000 mg  1,000 mg Oral TID Kya Ramos MD   1,000 mg at 03/24/25 0755    amLODIPine (NORVASC) tablet 10 mg  10 mg Oral Daily Kya Ramos MD   10 mg at 03/24/25 0755    [Held by provider] aspirin EC tablet 81 mg  81 mg Oral Daily Kya Ramos MD        atorvastatin (LIPITOR) tablet 80 mg  80 mg Oral At Bedtime Kya Ramos MD   80 mg at 03/23/25 2228    [Held by provider] losartan (COZAAR) tablet 25 mg  25 mg Oral Daily Kya Ramos MD        metoprolol succinate ER (TOPROL XL) 24 hr tablet 25 mg  25 mg Oral BID Kya Ramos MD   25 mg at 03/24/25 0755    mirtazapine (REMERON) tablet 15 mg  15 mg Oral At Bedtime Kya Ramos  MD   15 mg at 03/23/25 2228    pantoprazole (PROTONIX) EC tablet 40 mg  40 mg Oral QPM Kya Ramos MD   40 mg at 03/23/25 1926    polyethylene glycol (MIRALAX) Packet 17 g  17 g Oral BID Kya Ramos MD   17 g at 03/24/25 0755    pregabalin (LYRICA) capsule 75 mg  75 mg Oral BID Kya Ramos MD   75 mg at 03/24/25 1014    sodium chloride (PF) 0.9% PF flush 3 mL  3 mL Intracatheter Q8H Kya Ramos MD   3 mL at 03/24/25 1012    [START ON 3/29/2025] vitamin D2 (ERGOCALCIFEROL) 29122 units (1250 mcg) capsule 50,000 Units  50,000 Units Oral Weekly Kya Ramos MD        No Known Allergies      Lab Results    Chemistry/lipid CBC Cardiac Enzymes/BNP/TSH/INR   Lab Results   Component Value Date    BUN 26.2 (H) 03/23/2025     03/23/2025    CO2 22 03/23/2025    Lab Results   Component Value Date    WBC 8.3 03/23/2025    HGB 12.6 (L) 03/23/2025    HCT 38.6 (L) 03/23/2025     (H) 03/23/2025     03/23/2025    Lab Results   Component Value Date    INR 0.96 03/23/2025

## 2025-03-24 NOTE — PROVIDER NOTIFICATION
Discussed with Dr. Balbuena (Urology) 6:56 PM 03/24/25 r/t new consult order - provider currently in OR but will evaluate patient tonight.    - Difficult Cath Cart present with 18F Coude Tip available

## 2025-03-25 ENCOUNTER — APPOINTMENT (OUTPATIENT)
Dept: PHYSICAL THERAPY | Facility: CLINIC | Age: 85
DRG: 480 | End: 2025-03-25
Attending: STUDENT IN AN ORGANIZED HEALTH CARE EDUCATION/TRAINING PROGRAM
Payer: MEDICARE

## 2025-03-25 ENCOUNTER — LAB REQUISITION (OUTPATIENT)
Dept: LAB | Facility: CLINIC | Age: 85
End: 2025-03-25
Payer: MEDICARE

## 2025-03-25 ENCOUNTER — APPOINTMENT (OUTPATIENT)
Dept: OCCUPATIONAL THERAPY | Facility: CLINIC | Age: 85
DRG: 480 | End: 2025-03-25
Attending: STUDENT IN AN ORGANIZED HEALTH CARE EDUCATION/TRAINING PROGRAM
Payer: MEDICARE

## 2025-03-25 DIAGNOSIS — I11.0 HYPERTENSIVE HEART DISEASE WITH HEART FAILURE (H): ICD-10-CM

## 2025-03-25 DIAGNOSIS — D64.9 ANEMIA, UNSPECIFIED: ICD-10-CM

## 2025-03-25 LAB
ANION GAP SERPL CALCULATED.3IONS-SCNC: 11 MMOL/L (ref 7–15)
BUN SERPL-MCNC: 25.6 MG/DL (ref 8–23)
CALCIUM SERPL-MCNC: 8.7 MG/DL (ref 8.8–10.4)
CHLORIDE SERPL-SCNC: 106 MMOL/L (ref 98–107)
CREAT SERPL-MCNC: 0.86 MG/DL (ref 0.67–1.17)
EGFRCR SERPLBLD CKD-EPI 2021: 86 ML/MIN/1.73M2
ERYTHROCYTE [DISTWIDTH] IN BLOOD BY AUTOMATED COUNT: 12.3 % (ref 10–15)
GLUCOSE BLDC GLUCOMTR-MCNC: 133 MG/DL (ref 70–99)
GLUCOSE SERPL-MCNC: 150 MG/DL (ref 70–99)
HCO3 SERPL-SCNC: 22 MMOL/L (ref 22–29)
HCT VFR BLD AUTO: 31.6 % (ref 40–53)
HGB BLD-MCNC: 10.6 G/DL (ref 13.3–17.7)
MCH RBC QN AUTO: 33 PG (ref 26.5–33)
MCHC RBC AUTO-ENTMCNC: 33.5 G/DL (ref 31.5–36.5)
MCV RBC AUTO: 98 FL (ref 78–100)
PLATELET # BLD AUTO: 155 10E3/UL (ref 150–450)
POTASSIUM SERPL-SCNC: 4.5 MMOL/L (ref 3.4–5.3)
RBC # BLD AUTO: 3.21 10E6/UL (ref 4.4–5.9)
SODIUM SERPL-SCNC: 139 MMOL/L (ref 135–145)
WBC # BLD AUTO: 9 10E3/UL (ref 4–11)

## 2025-03-25 PROCEDURE — 250N000011 HC RX IP 250 OP 636: Mod: JZ | Performed by: PHYSICIAN ASSISTANT

## 2025-03-25 PROCEDURE — 97530 THERAPEUTIC ACTIVITIES: CPT | Mod: GP

## 2025-03-25 PROCEDURE — 36415 COLL VENOUS BLD VENIPUNCTURE: CPT | Performed by: HOSPITALIST

## 2025-03-25 PROCEDURE — 85027 COMPLETE CBC AUTOMATED: CPT | Performed by: HOSPITALIST

## 2025-03-25 PROCEDURE — 250N000013 HC RX MED GY IP 250 OP 250 PS 637: Performed by: HOSPITALIST

## 2025-03-25 PROCEDURE — 250N000013 HC RX MED GY IP 250 OP 250 PS 637: Performed by: STUDENT IN AN ORGANIZED HEALTH CARE EDUCATION/TRAINING PROGRAM

## 2025-03-25 PROCEDURE — 99232 SBSQ HOSP IP/OBS MODERATE 35: CPT | Performed by: INTERNAL MEDICINE

## 2025-03-25 PROCEDURE — 250N000013 HC RX MED GY IP 250 OP 250 PS 637: Performed by: PHYSICIAN ASSISTANT

## 2025-03-25 PROCEDURE — 999N000226 HC STATISTIC SLP IP EVAL DEFER

## 2025-03-25 PROCEDURE — 80048 BASIC METABOLIC PNL TOTAL CA: CPT | Performed by: HOSPITALIST

## 2025-03-25 PROCEDURE — 250N000013 HC RX MED GY IP 250 OP 250 PS 637: Performed by: INTERNAL MEDICINE

## 2025-03-25 PROCEDURE — 97166 OT EVAL MOD COMPLEX 45 MIN: CPT | Mod: GO

## 2025-03-25 PROCEDURE — 97162 PT EVAL MOD COMPLEX 30 MIN: CPT | Mod: GP

## 2025-03-25 PROCEDURE — 99232 SBSQ HOSP IP/OBS MODERATE 35: CPT | Performed by: HOSPITALIST

## 2025-03-25 PROCEDURE — 97110 THERAPEUTIC EXERCISES: CPT | Mod: GP

## 2025-03-25 PROCEDURE — 120N000001 HC R&B MED SURG/OB

## 2025-03-25 PROCEDURE — 250N000011 HC RX IP 250 OP 636: Performed by: INTERNAL MEDICINE

## 2025-03-25 PROCEDURE — 97535 SELF CARE MNGMENT TRAINING: CPT | Mod: GO

## 2025-03-25 RX ORDER — FUROSEMIDE 10 MG/ML
40 INJECTION INTRAMUSCULAR; INTRAVENOUS ONCE
Status: COMPLETED | OUTPATIENT
Start: 2025-03-25 | End: 2025-03-25

## 2025-03-25 RX ORDER — FUROSEMIDE 20 MG/1
20 TABLET ORAL DAILY
Status: DISCONTINUED | OUTPATIENT
Start: 2025-03-26 | End: 2025-03-26 | Stop reason: HOSPADM

## 2025-03-25 RX ADMIN — ACETAMINOPHEN 975 MG: 325 TABLET ORAL at 08:00

## 2025-03-25 RX ADMIN — FUROSEMIDE 40 MG: 10 INJECTION, SOLUTION INTRAVENOUS at 12:12

## 2025-03-25 RX ADMIN — OXYCODONE 5 MG: 5 TABLET ORAL at 13:55

## 2025-03-25 RX ADMIN — ASPIRIN 81 MG: 81 TABLET, COATED ORAL at 07:55

## 2025-03-25 RX ADMIN — AMLODIPINE BESYLATE 10 MG: 10 TABLET ORAL at 07:55

## 2025-03-25 RX ADMIN — METOPROLOL SUCCINATE 25 MG: 25 TABLET, EXTENDED RELEASE ORAL at 07:56

## 2025-03-25 RX ADMIN — TAMSULOSIN HYDROCHLORIDE 0.4 MG: 0.4 CAPSULE ORAL at 07:55

## 2025-03-25 RX ADMIN — PREGABALIN 75 MG: 75 CAPSULE ORAL at 07:56

## 2025-03-25 RX ADMIN — ACETAMINOPHEN 975 MG: 325 TABLET ORAL at 17:15

## 2025-03-25 RX ADMIN — ASPIRIN 81 MG: 81 TABLET, COATED ORAL at 21:18

## 2025-03-25 RX ADMIN — ATORVASTATIN CALCIUM 80 MG: 40 TABLET, FILM COATED ORAL at 21:18

## 2025-03-25 RX ADMIN — SENNOSIDES AND DOCUSATE SODIUM 1 TABLET: 50; 8.6 TABLET ORAL at 07:55

## 2025-03-25 RX ADMIN — PANTOPRAZOLE SODIUM 40 MG: 40 TABLET, DELAYED RELEASE ORAL at 21:18

## 2025-03-25 RX ADMIN — METOPROLOL SUCCINATE 25 MG: 25 TABLET, EXTENDED RELEASE ORAL at 21:18

## 2025-03-25 RX ADMIN — CEFAZOLIN 1 G: 1 INJECTION, POWDER, FOR SOLUTION INTRAMUSCULAR; INTRAVENOUS at 05:07

## 2025-03-25 RX ADMIN — HYDROXYZINE HYDROCHLORIDE 10 MG: 10 TABLET ORAL at 09:20

## 2025-03-25 RX ADMIN — ACETAMINOPHEN 975 MG: 325 TABLET ORAL at 01:24

## 2025-03-25 RX ADMIN — MIRTAZAPINE 15 MG: 15 TABLET, FILM COATED ORAL at 21:18

## 2025-03-25 RX ADMIN — PREGABALIN 75 MG: 75 CAPSULE ORAL at 21:17

## 2025-03-25 RX ADMIN — LOSARTAN POTASSIUM 25 MG: 25 TABLET, FILM COATED ORAL at 09:21

## 2025-03-25 RX ADMIN — OXYCODONE 5 MG: 5 TABLET ORAL at 09:21

## 2025-03-25 RX ADMIN — HYDROMORPHONE HYDROCHLORIDE 0.5 MG: 1 INJECTION, SOLUTION INTRAMUSCULAR; INTRAVENOUS; SUBCUTANEOUS at 11:03

## 2025-03-25 RX ADMIN — POLYETHYLENE GLYCOL 3350 17 G: 17 POWDER, FOR SOLUTION ORAL at 07:55

## 2025-03-25 ASSESSMENT — ACTIVITIES OF DAILY LIVING (ADL)
ADLS_ACUITY_SCORE: 64
ADLS_ACUITY_SCORE: 60
ADLS_ACUITY_SCORE: 64
ADLS_ACUITY_SCORE: 64
ADLS_ACUITY_SCORE: 60
ADLS_ACUITY_SCORE: 64
ADLS_ACUITY_SCORE: 60
ADLS_ACUITY_SCORE: 64
ADLS_ACUITY_SCORE: 60
ADLS_ACUITY_SCORE: 64
ADLS_ACUITY_SCORE: 60
ADLS_ACUITY_SCORE: 64
DEPENDENT_IADLS:: CLEANING;SHOPPING;TRANSPORTATION
ADLS_ACUITY_SCORE: 64
ADLS_ACUITY_SCORE: 60
ADLS_ACUITY_SCORE: 60
ADLS_ACUITY_SCORE: 64

## 2025-03-25 NOTE — PROGRESS NOTES
03/25/25 1025   Appointment Info   Signing Clinician's Name / Credentials (OT) He Lugo, OTR/L   Quick Adds   Quick Adds Certification   Living Environment   People in Home spouse   Current Living Arrangements assisted living   Home Accessibility no concerns   Living Environment Comments Pt uses 4WW at baseline, has walkin shower w/ shower chair and grab bars, RTS w/ grab bars   Self-Care   Usual Activity Tolerance moderate   Current Activity Tolerance poor   Equipment Currently Used at Home grab bar, toilet;grab bar, tub/shower;raised toilet seat;shower chair;walker, rolling   Fall history within last six months yes   Number of times patient has fallen within last six months 1   Activity/Exercise/Self-Care Comment Pt IND w/ ADLs and some IADLs at baseline   General Information   Onset of Illness/Injury or Date of Surgery 03/23/25   Referring Physician Roberto Carlos Yates,    Patient/Family Therapy Goal Statement (OT) to get stronger   Additional Occupational Profile Info/Pertinent History of Current Problem L hip fx after fall w/ internal fixation/pinning. Past CVA, CHF   Existing Precautions/Restrictions fall   Left Lower Extremity (Weight-bearing Status) weight-bearing as tolerated (WBAT)   Cognitive Status Examination   Orientation Status orientation to person, place and time   Affect/Mental Status (Cognitive) anxious;confused   Cognitive Status Comments Pt doesn't always give accurate answers. family would often correct him.   Visual Perception   Visual Impairment/Limitations WFL   Sensory   Sensory Quick Adds sensation intact   Pain Assessment   Patient Currently in Pain Yes, see Vital Sign flowsheet   Posture   Posture not impaired   Range of Motion Comprehensive   General Range of Motion no range of motion deficits identified   Strength Comprehensive (MMT)   Comment, General Manual Muscle Testing (MMT) Assessment generalized weakness   Bed Mobility   Bed Mobility supine-sit;sit-supine;rolling left;rolling  right;scooting/bridging   Comment (Bed Mobility) Max Ax2   Transfers   Transfers bed-chair transfer;sit-stand transfer;toilet transfer   Transfer Comments Max Ax2   Activities of Daily Living   BADL Assessment/Intervention lower body dressing;toileting;bathing   Bathing Assessment/Intervention   [place] Level (Bathing) lower body;dependent (less than 25% patient effort)   Lower Body Dressing Assessment/Training   [place] Level (Lower Body Dressing) lower body dressing skills;dependent (less than 25% patient effort)   Toileting   [place] Level (Toileting) adjust/manage clothing;dependent (less than 25% patient effort)   Clinical Impression   Criteria for Skilled Therapeutic Interventions Met (OT) Yes, treatment indicated   OT Diagnosis decreased ADLs   Influenced by the following impairments L hip internal fixation following a fall   OT Problem List-Impairments impacting ADL activity tolerance impaired;mobility;pain;balance;cognition;hearing;strength   Assessment of Occupational Performance 5 or more Performance Deficits   Identified Performance Deficits LE dressing/bathing, bed mobility, all transfers, toileting, cognition   Planned Therapy Interventions (OT) ADL retraining;bed mobility training;transfer training;cognition;strengthening   Clinical Decision Making Complexity (OT) detailed assessment/moderate complexity   Risk & Benefits of therapy have been explained evaluation/treatment results reviewed;patient;spouse/significant other;daughter   OT Total Evaluation Time   OT Eval, Moderate Complexity Minutes (11038) 10   OT Goals   Therapy Frequency (OT) 5 times/week   OT Predicted Duration/Target Date for Goal Attainment 03/31/25   OT Goals Lower Body Dressing;Bed Mobility;Toilet Transfer/Toileting   OT: Lower Body Dressing Minimal assist;using adaptive equipment   OT: Bed Mobility Moderate assist;supine to/from sitting   OT: Toilet Transfer/Toileting Moderate assist;toilet transfer;cleaning and  garment management   Interventions   Interventions Quick Adds Self-Care/Home Management   Self-Care/Home Management   Self-Care/Home Mgmt/ADL, Compensatory, Meal Prep Minutes (24042) 15   Symptoms Noted During/After Treatment (Meal Preparation/Planning Training) increased pain   Treatment Detail/Skilled Intervention Pt instructed on bed mobility techniques using leg  and HOB elevated - pt needing Mod A along w/ leg  to move LLE to EOB and Max Ax2 to sit up EOB including scooting anteriorly via bedsheet. Verbal/tactile cues for safe STS from elevated bed - completed w/ Max Ax2 and FWW. Pt having increased pain w/ all movement. Pt needing Min Ax2 for balance and for directing FWW for 90 degree turn to chair. Pt having difficulty taking any steps backwards - recliner rolled in behind pt w/ Mod Ax2 to safely lower down. Pt ended session in recliner w/ alarm on and all needs w/in reach.   OT Discharge Planning   OT Plan Ax2 all transfers/bed mobility, LE dressing w/ AE, pivots, seated EOB g/h   OT Discharge Recommendation (DC Rec) (S)  Transitional Care Facility   OT Rationale for DC Rec Pt having increased pain w/ all movement. Ax2 for all transfers/bed mobility. Pt [facility] good home setup at [facility] but pt will need TCU to increase safety and IND w/ all ADLs   OT Brief overview of current status Max Ax2 bed mobility/pivot to chair, increased pain   OT Total Distance Amb During Session (feet) 1   Total Session Time   Timed Code Treatment Minutes 15   Total Session Time (sum of timed and untimed services) 25

## 2025-03-25 NOTE — PROGRESS NOTES
Assessment/Plan:  Preop cardiovascular clearance: The patient was doing well with his surgery yesterday.  Coronary artery disease status post CABG in 1999: No chest pain.  Echo was discussed, no significant interval change.  Continue aspirin, atorvastatin, metoprolol succinate.  Chronic congestive heart failure with preserved ejection fraction, secondary pulmonary hypertension: The patient has no complaints of her shortness of breath.  However, he developed by crackles in both bases of lungs which is new.  Continue metoprolol succinate, losartan.  1 dose of IV Lasix 40 mg, and then 20 mg oral daily.  He was not on diuretics at home.  Frequent PVC, bigeminy pattern, 3 beats of nonsustained ventricular tachycardia: His PVC did not reduce LV systolic function.  No palpitations.  Continue to monitor.  Continue metoprolol succinate 25 mg twice a day.  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  Continue monitor and conservative management at this time.  May consider valvular clinic follow-up as outpatient.  Benign essential hypertension: His blood pressure is controlled continue current medications.  Dyslipidemia: Continue atorvastatin.  Left hip femoral fracture, other chronic medical disease: Please see other team management for details.    Subjective:  Interval History:  Has no complaints of chest pain or shortness of breath.  No leg edema.    Current Medications:  Scheduled Meds:  Current Facility-Administered Medications   Medication Dose Route Frequency Provider Last Rate Last Admin    acetaminophen (TYLENOL) tablet 975 mg  975 mg Oral Q8H Roberto Carlos Yates DO   975 mg at 03/25/25 0800    amLODIPine (NORVASC) tablet 10 mg  10 mg Oral Daily Kya Ramos MD   10 mg at 03/25/25 0755    aspirin EC tablet 81 mg  81 mg Oral BID Tres Bar PA-C   81 mg at 03/25/25 0755    atorvastatin (LIPITOR) tablet 80 mg  80 mg Oral At Bedtime  Kya Ramos MD   80 mg at 03/24/25 2110    furosemide (LASIX) injection 40 mg  40 mg Intravenous Once Hannah Torres MD        losartan (COZAAR) tablet 25 mg  25 mg Oral Daily Hannah Torres MD   25 mg at 03/25/25 0921    metoprolol succinate ER (TOPROL XL) 24 hr tablet 25 mg  25 mg Oral BID Jose Valderrama MD   25 mg at 03/25/25 0756    mirtazapine (REMERON) tablet 15 mg  15 mg Oral At Bedtime Kya Ramos MD   15 mg at 03/24/25 2110    pantoprazole (PROTONIX) EC tablet 40 mg  40 mg Oral QPM Roberto Carlos Yates DO   40 mg at 03/24/25 2000    polyethylene glycol (MIRALAX) Packet 17 g  17 g Oral Daily Tres Bar PA-C   17 g at 03/25/25 0755    pregabalin (LYRICA) capsule 75 mg  75 mg Oral BID Kya Ramos MD   75 mg at 03/25/25 0756    senna-docusate (SENOKOT-S/PERICOLACE) 8.6-50 MG per tablet 1 tablet  1 tablet Oral BID Tres Bar PA-C   1 tablet at 03/25/25 0755    sodium chloride (PF) 0.9% PF flush 3 mL  3 mL Intracatheter Q8H TAMARA Tres Bar PA-C   3 mL at 03/25/25 0758    sodium chloride (PF) 0.9% PF flush 3 mL  3 mL Intracatheter Q8H Kya Ramos MD   3 mL at 03/24/25 1012    tamsulosin (FLOMAX) capsule 0.4 mg  0.4 mg Oral Daily Roberto Carlos Yates DO   0.4 mg at 03/25/25 0755    [START ON 3/29/2025] vitamin D2 (ERGOCALCIFEROL) 41193 units (1250 mcg) capsule 50,000 Units  50,000 Units Oral Weekly Kya Ramos MD           Objective:   Vital signs in last 24 hours:  Temp:  [97  F (36.1  C)-98  F (36.7  C)] 97.4  F (36.3  C)  Pulse:  [54-96] 64  Resp:  [11-26] 12  BP: (110-166)/() 135/64  SpO2:  [91 %-100 %] 94 %  Weight:   [unfilled]     Physical Exam:  General Appearance:   Awake, Alert, No acute distress.   HEENT:  No scleral icterus; the mucous membranes were moist.   Neck: No cervical bruits. No jugular venous distention   Lungs:   Bibasilar crackles. No wheezing.   Cardiovascular:   RRR, normal first and second heart sounds with II/VI systolic murmurs at RUSB. No rubs or gallops.    "  Abdomen:  Soft. No tenderness. Bowels sounds are present   Extremities: No leg edema. Equal posterior tibial pulsese.   Skin: Warm, Dry. No rashes.   Neurologic: Mood and affect are appropriate. No focal deficits.         Cardiographics:   Report: personally reviewed. .      Tele monitoring -   Sinus rhythm with frequent PVCs, 3 beats of NSVT.    Echocardiogram 3-:  1. The left ventricle is normal in size with normal left ventricular wall thickness.  - Left ventricular function is decreased. The ejection fraction is 50-55% (borderline).  2. The right ventricle is mildly dilated with mildly decreased right ventricular systolic function  3. Both atria are moderately dilated.  4. The aortic valve is trileaflet with aortic valve sclerosis.  5. There is moderately severe tricuspid valve regurgitation.  6. Right ventricular systolic pressure is elevated (55 mmHg), consistent withbsevere pulmonary hypertension.  7 The patient exhibited frequent PVCs.  8. There is no comparison study available.      Lab Results:   personally reviewed.     Lab Results   Component Value Date     03/25/2025    CO2 22 03/25/2025    BUN 25.6 03/25/2025     No results found for: \"CKTOTAL\", \"CKMB\", \"TROPONINI\"  Lab Results   Component Value Date    WBC 9.0 03/25/2025    HGB 10.6 03/25/2025    HCT 31.6 03/25/2025    MCV 98 03/25/2025     03/25/2025     No results found for: \"CHOL\", \"TRIG\", \"HDL\", \"LDL\", \"LDLDIRECT\"        "

## 2025-03-25 NOTE — PLAN OF CARE
Problem: Adult Inpatient Plan of Care  Goal: Plan of Care Review  Description: The Plan of Care Review/Shift note should be completed every shift.  The Outcome Evaluation is a brief statement about your assessment that the patient is improving, declining, or no change.  This information will be displayed automatically on your shiftnote.  Outcome: Progressing   Goal Outcome Evaluation:  Patient vital signs are at baseline: Yes  Patient able to ambulate as they were prior to admission or with assist devices provided by therapies during their stay:  Yes with assist of 2 with walker + belt. Able to tolerate few steps from chair to bed with maximum assistance.   Patient MUST void prior to discharge:  No,  Reason:  Anderson placed by Urology (3/24). Will have IP TOV or OP TOV  Patient able to tolerate oral intake:  Yes  Pain has adequate pain control using Oral analgesics:  No,  Reason:  IV Dilaudid administered for severe breakthrough pain following occupational therapy/before physical therapy.   Does patient have an identified :  Yes  Has goal D/C date and time been discussed with patient:  Yes - TCU pending progression.

## 2025-03-25 NOTE — CONSULTS
[facility] CONSULTATION NOTE      Shaun Berkowitz   NEW PERSPECTIVES  [address] [facility] [address]  84 year old  male  Admission Date/Time: 3/23/2025  3:53 PM  Primary Care Provider:  Marcella Patterson    I was asked to see this patient by Dr. Carter for evaluation of post operative urinary retention.     HPI: 84-year-old male who presented with left hip fracture and is status post surgical repair with postoperative urinary retention.  Nursing attempted straight catheter with return of blood and passage of a 16 Romansh coudé without success.  Urology consulted.    Patient denies any urologic history.  Denies LUTS at baseline.  No prior urologic surgeries or history of enlarged prostate.  Not on any medications for voiding.    Under sterile procedure an 18 Romansh coudé catheter was placed without resistance.  Clear return of yellow urine was seen.  10 cc placed in balloon.    REVIEW OF SYSTEMS:    Patient Active Problem List   Diagnosis    Hip fracture, left, closed, initial encounter (H)     History reviewed. No pertinent past medical history.  History reviewed. No pertinent surgical history.  Social History     Socioeconomic History    Marital status:      Spouse name: Not on file    Number of children: Not on file    Years of education: Not on file    Highest education level: Not on file   Occupational History    Not on file   Tobacco Use    Smoking status: Not on file    Smokeless tobacco: Not on file   Substance and Sexual Activity    Alcohol use: Not on file    Drug use: Not on file    Sexual activity: Not on file   Other Topics Concern    Not on file   Social History Narrative    Not on file     Social Drivers of Health     Financial Resource Strain: Low Risk  (3/23/2025)    Financial Resource Strain     Within the past 12 months, have you or your family members you live with been unable to get utilities (heat, electricity) when it was really needed?: No   Food Insecurity: Low Risk   (3/23/2025)    Food Insecurity     Within the past 12 months, did you worry that your food would run out before you got money to buy more?: No     Within the past 12 months, did the food you bought just not last and you didn t have money to get more?: No   Transportation Needs: Low Risk  (3/23/2025)    Transportation Needs     Within the past 12 months, has lack of transportation kept you from medical appointments, getting your medicines, non-medical meetings or appointments, work, or from getting things that you need?: No   Physical Activity: Insufficiently Active (7/31/2023)    Received from [facility]    Exercise Vital Sign     Days of Exercise per Week: 7 days     Minutes of Exercise per Session: 10 min   Stress: No Stress Concern Present (7/31/2023)    Received from [facility]    [employer] [place] [employer] - Occupational Stress Questionnaire     Feeling of Stress : Not at all   Social Connections: Moderately Isolated (7/31/2023)    Received from [facility]    Social Connection and Isolation Panel [NHANES]     Frequency of Communication with Friends and Family: More than three times a week     Frequency of Social Gatherings with Friends and Family: More than three times a week     Attends Pentecostalism Services: Never     Active Member of Clubs or Organizations: No     Attends Club or Organization Meetings: Never     Marital Status:    Interpersonal Safety: Low Risk  (3/23/2025)    Interpersonal Safety     Do you feel physically and emotionally safe where you currently live?: Yes     Within the past 12 months, have you been hit, slapped, kicked or otherwise physically hurt by someone?: No     Within the past 12 months, have you been humiliated or emotionally abused in other ways by your partner or ex-partner?: No   Housing Stability: Low Risk  (3/23/2025)    Housing Stability     Do you  "have housing? : Yes     Are you worried about losing your housing?: No         ALLERGIES/SENSITIVITIES: No Known Allergies    Current inpatient medications reviewed    PHYSICAL EXAM:     General Appearance:   Drowsy  /71 (BP Location: Right arm, Cuff Size: Adult Regular)   Pulse 55   Temp 97.5  F (36.4  C) (Oral)   Resp 11   Ht 1.727 m (5' 8\")   Wt 72.6 kg (160 lb)   SpO2 93%   BMI 24.33 kg/m    Body mass index is 24.33 kg/m .  HEAD, EARS, NOSE, MOUTH, AND THROAT: [place]/Moist  RESPIRATORY: On nasal cannula  MUSCULOSKELETAL: Normal ROM observed  NEUROLOGIC: Grossly intact, symmetric fascial CN and voice.  PSYCHIATRIC: Affect appropriate, answers are clear and linear.  : circumcised. 18F guevara placed        CONSULTATION ASSESSMENT AND PLAN:    84-year-old male with postoperative urinary retention after hip surgery.  Guevara catheter placed.    Please start on Flomax and continue at discharge.  Will have outpatient follow-up with urology clinic for trial of void if discharges soon. We will arrange. If here for >4 days can have void trial while inpatient. Please perform in the early morning if catheter to be removed.     Thank you for the kind consult and allowing me to participate in the care of your patient. Feel free to call our team with any questions.    Angélica Balbuena MD    "

## 2025-03-25 NOTE — PLAN OF CARE
"Speech-Language Pathology    Order received for IP SLP consult due to concerns for dysphagia. Order states, \"throat clear, frequent cough, hx of stroke\". Chart reviewed and case discussed with ordering provider and RN via [employer]. Previous records indicate that patient had a video swallow study on 2/13/24 at OSH and presented with severe oropharyngeal dysphagia at that time. SLP report states the following: \"Patient presents with Severe oropharyngeal dysphagia characterized by reduced base of tongue retraction, minimal laryngeal elevation and laryngeal vestibule closure, no anterior hyoid movement or epiglottic inversion, and minimal distention of upper esophageal sphincter resulting in severe pharyngeal residuals in the valleculae and pyriform sinuses. Patient presented with aspiration of all consistencies that he had a cough response to that was mostly productive with small amounts of aspiration. Patient has severe residuals in his pharynx, mostly in his pyriform sinuses that continue to fill and spill over into the airway, resulting in aspiration. Anticipate dysphagia is baseline as a fall likely wouldn't cause such severe dysphagia. At baseline, his body could probably handle aspiration as he was mobile and able to complete oral cares independently; however, is currently exacerbated by being bed bound due to injuries. Recommend general diet, thin liquids and follow strict safe swallow strategies. If worsening respiratory status, the other option would be strict NPO because thin liquids vs thick liquids was no better. Of note, there appeared to be a protrusion on C5, C6, and C7.\" Patient elected to continue a Regular diet with thin liquids at that time.    SLP met with patient, daughter, and wife at bedside this AM. Notably, patient presented with a gurgly vocal quality and audible upper airway secretions. Also of note, his wife has dementia and was not engaged/interactive during this visit. Results of VFSS from " 2/2024 were reviewed with patient and family. SLP explained that patient's swallow function is unlikely to have improved over the past year. He is likely still at high risk to aspirate any/all intake, regardless of consistency. Patient and family were educated on the risks of aspiration and the potential negative consequences of this. Patient and daughter verbalized understanding. Patient wishes to continue an oral diet at this time. He was provided with education on safer swallowing strategies, but cautioned that these will not prevent aspiration. At best, they may reduce the volume of liquid/food aspirated. Patient again verbalized understanding.     As patient is clear about his desire to continue an oral diet despite ongoing aspiration risk, will defer swallow evaluation and complete current Speech Therapy order at this time.

## 2025-03-25 NOTE — PROGRESS NOTES
Patient vital signs are at baseline: No,  Reason:  2L O2 via NC  Patient able to ambulate as they were prior to admission or with assist devices provided by therapies during their stay:  No,  Reason:  Has not ambulated yet  Patient MUST void prior to discharge:  No,  Reason:  Anderson patent and draining   Patient able to tolerate oral intake:  Yes  Pain has adequate pain control using Oral analgesics:  Yes  Does patient have an identified :  Yes  Has goal D/C date and time been discussed with patient:  No,  Reason:  Has not been seen by therapies yet    Patient A&O x2, disoriented to time and situation. Able to make needs known. VSS on 2L 2 via NC. Held evening Metoprolol due to HR parameters. NSR with frequent PVCs on telemetry. Denied pain throughout shift. Left PIV infusing LR at 10mL/hr. Intermittent IV antibiotics. Frequent coughing and clearing throat after first medication administration, the following med pass completed with pills crushed in applesauce with better outcomes. Denied SOB, MIJARES, and chest pain. Lung sounds diminished. CMS intact. Surgical dressing CDI. Anderson patent and draining, placed by Dr. Balbuena. No BM during shift. Regular diet, tolerating well. Not out of bed during shift. Discharge pending.

## 2025-03-25 NOTE — CONSULTS
Care Management Initial Consult    General Information  Assessment completed with: Patient, Children,    Type of CM/SW Visit: Initial Assessment    Primary Care Provider verified and updated as needed: Yes   Readmission within the last 30 days: no previous admission in last 30 days      Reason for Consult: discharge planning  Advance Care Planning:            Communication Assessment  Patient's communication style: spoken language (English or Bilingual)    Hearing Difficulty or Deaf: no   Wear Glasses or Blind: yes    Cognitive  Cognitive/Neuro/Behavioral: .WDL except, orientation  Level of Consciousness: alert, intermittent confusion  Arousal Level: opens eyes spontaneously  Orientation: disoriented to, time, place  Mood/Behavior: calm, cooperative  Best Language: 1 - Mild to moderate  Speech: garbled    Living Environment:   People in home: facility resident, spouse     Current living Arrangements: assisted living  Name of Facility: [facility]   Able to return to prior arrangements: yes       Family/Social Support:  Care provided by: self, spouse/significant other, child(sandra)  Provides care for:    Marital Status:   Support system: Wife, Children          Description of Support System: Supportive, Involved         Current Resources:   Patient receiving home care services: No        Community Resources: None  Equipment currently used at home: grab bar, toilet, grab bar, tub/shower, raised toilet seat, shower chair, walker, rolling  Supplies currently used at home: None    Employment/Financial:  Employment Status: retired        Financial Concerns: none   Referral to Financial Worker: No       Does the patient's insurance plan have a 3 day qualifying hospital stay waiver?  No       Which insurance plan 3 day waiver is available? Alternative insurance waiver    Will the waiver be used for post-acute placement? No    Lifestyle & Psychosocial Needs:  Social Drivers of Health     Food Insecurity:  Low Risk  (3/23/2025)    Food Insecurity     Within the past 12 months, did you worry that your food would run out before you got money to buy more?: No     Within the past 12 months, did the food you bought just not last and you didn t have money to get more?: No   Depression: Not at risk (10/26/2023)    Received from [facility]    PHQ-2     PHQ-2 Total: 0   Housing Stability: Low Risk  (3/23/2025)    Housing Stability     Do you have housing? : Yes     Are you worried about losing your housing?: No   Tobacco Use: Medium Risk (2/16/2024)    Received from [facility]    Patient History     Smoking Tobacco Use: Former     Smokeless Tobacco Use: Never     Passive Exposure: Not on file   Financial Resource Strain: Low Risk  (3/23/2025)    Financial Resource Strain     Within the past 12 months, have you or your family members you live with been unable to get utilities (heat, electricity) when it was really needed?: No   Alcohol Use: Not At Risk (7/31/2023)    Received from [facility]    AUDIT-C     Frequency of Alcohol Consumption: Monthly or less     Average Number of Drinks: 1 or 2     Frequency of Binge Drinking: Never   Transportation Needs: Low Risk  (3/23/2025)    Transportation Needs     Within the past 12 months, has lack of transportation kept you from medical appointments, getting your medicines, non-medical meetings or appointments, work, or from getting things that you need?: No   Physical Activity: Insufficiently Active (7/31/2023)    Received from [facility]    Exercise Vital Sign     Days of Exercise per Week: 7 days     Minutes of Exercise per Session: 10 min   Interpersonal Safety: Low Risk  (3/23/2025)    Interpersonal Safety     Do you feel physically and emotionally safe where you currently live?: Yes     Within the past 12 months, have you been hit, slapped,  kicked or otherwise physically hurt by someone?: No     Within the past 12 months, have you been humiliated or emotionally abused in other ways by your partner or ex-partner?: No   Stress: No Stress Concern Present (7/31/2023)    Received from [facility]    [employer] [place] [employer] - Occupational Stress Questionnaire     Feeling of Stress : Not at all   Social Connections: Moderately Isolated (7/31/2023)    Received from [facility]    Social Connection and Isolation Panel [NHANES]     Frequency of Communication with Friends and Family: More than three times a week     Frequency of Social Gatherings with Friends and Family: More than three times a week     Attends Hindu Services: Never     Active Member of Clubs or Organizations: No     Attends Club or Organization Meetings: Never     Marital Status:    Health Literacy: Not on file       Functional Status:  Prior to admission patient needed assistance:   Dependent ADLs:: Independent, Ambulation-walker  Dependent IADLs:: Cleaning, Shopping, Transportation       Mental Health Status:  Mental Health Status: No Current Concerns       Chemical Dependency Status:  Chemical Dependency Status: No Current Concerns             Values/Beliefs:  Spiritual, Cultural Beliefs, Hindu Practices, Values that affect care: no               Discussed  Partnership in Safe Discharge Planning  document with patient/family: Yes:     Additional Information:  SWCM met and introduced self and CM services to Pt and several family members including wife, daughters, grandchildren and great grandchildren. Pt lives at [facility] in [place] with his wife. Pt does not get services.  Pt uses a walker.  Therapy recommendations for TCU.  Pt has been to [facility] a couple of times and would like to return there.  Referral made,     2:28 PM  Pt accepted at [facility].  JV let Pt know and asked that JV talk with his daughter- Fe.  ANKUSHCM updated Fe. ANKUSHCM talked with Shereen at TCU and can admit between 11:00 AM to 1:00 PM or 3:00 PM to 4:30 PM. Fe is coming to hospital to discuss transportation.     Next Steps: RADHA Latif

## 2025-03-25 NOTE — PROGRESS NOTES
03/25/25 1115   Appointment Info   Signing Clinician's Name / Credentials (PT) Mile Raygoza, PT, DPT   Rehab Comments (PT) Marcella Morales, rehab aide, assists during PT session   Living Environment   People in Home spouse   Current Living Arrangements assisted living   Home Accessibility no concerns   Self-Care   Equipment Currently Used at Home walker, rolling   General Information   Onset of Illness/Injury or Date of Surgery 03/23/25   Referring Physician Roberto Carlos Yates, DO   Patient/Family Therapy Goals Statement (PT) to get better   Pertinent History of Current Problem (include personal factors and/or comorbidities that impact the POC) 83 year old male, with a hx of CAD s/p CABG in 1999, ischemic cardiomyopathy, Heart failure with mildly reduced ejection fraction, pulm HTN, chronic L ICA occlusion, with prior CVAs, residual right sided weakness and aphasia, pernicious anemia, syncope, chronic back pain, pelvic fracture and humerus fracture in 2/2024 s/p fixation at [facility] who presented to following a fall at home. INTERNAL FIXATION, FRACTURE, TROCHANTERIC, HIP, USING PINS OR RODS   Existing Precautions/Restrictions weight bearing   Weight-Bearing Status - LLE weight-bearing as tolerated   Weight-Bearing Status - RLE full weight-bearing   Transfers   Transfers sit-stand transfer   Comment, (Transfers) Max assist x 1 with FWW for sit<>stand transfer. Verbal cues for safety and safe hand placement.   Sit-Stand Transfer   Sit-Stand [place] (Transfers) verbal cues;maximum assist (25% patient effort);1 person assist   Assistive Device (Sit-Stand Transfers) walker, front-wheeled   Comment, (Sit-Stand Transfer) Max assist x 1 with FWW for sit<>stand transfers. Verbal cues for safety and safe hadn placement.   Gait/Stairs (Locomotion)   Comment, (Gait/Stairs) Unable to assess due to pain   Clinical Impression   Criteria for Skilled Therapeutic Intervention Yes, treatment indicated   PT Diagnosis (PT)  impaired functional mobility   Influenced by the following impairments weakness, pain   Functional limitations due to impairments transfers, ambulation   Clinical Presentation (PT Evaluation Complexity) stable   Clinical Presentation Rationale Pt presents as medically diagnosed   Clinical Decision Making (Complexity) moderate complexity   Planned Therapy Interventions (PT) balance training;bed mobility training;gait training;home exercise program;patient/family education;ROM (range of motion);strengthening;stretching;transfer training   Risk & Benefits of therapy have been explained evaluation/treatment results reviewed;care plan/treatment goals reviewed;patient;spouse/significant other;daughter   PT Total Evaluation Time   PT Eval, Moderate Complexity Minutes (44953) 10   Physical Therapy Goals   PT Frequency 5x/week   PT Predicted Duration/Target Date for Goal Attainment 04/04/25   PT Goals Transfers;Gait;PT Goal 1   PT: Transfers Minimal assist;Sit to/from stand;Assistive device  (FWW)   PT: Gait Minimal assist;Assistive device;Rolling walker;5 feet  (FWW)   PT: Goal 1 SBA with supine BLE strengthening exercises   Interventions   Interventions Quick Adds Therapeutic Activity;Therapeutic Procedure   Therapeutic Procedure/Exercise   Ther. Procedure: strength, endurance, ROM, flexibillity Minutes (84207) 10   Treatment Detail/Skilled Intervention Pt educated on and guided through supine BLE strengthening exercises x 10 each. Verbal cues, tactile cues and assist with correct technique. Education provided to family on how to assist pt with exercises.   Therapeutic Activity   Therapeutic Activities: dynamic activities to improve functional performance Minutes (84223) 15   Symptoms Noted During/After Treatment Increased pain   Treatment Detail/Skilled Intervention Pt sitting up in chair. Education on plan for PT session. Cues to scoot to edge of chair. Max assist x 1 with FWW for sit<>stand transfer. Verbal cues for  safety and safe hand placement. Tactile cues for safe hand placement. Assist with stabilizing FWW. Pt able to stand for about 1 minute before reporting fatigue. Pt reports that it feels like he is leaning forward. Cues for upright posture. Pt requries seated rest break. Mod assist x 2 with FWW for sit<>stand transfer. Verbal cues for safety and safe hand placement. Pt able to stand for about two minutes. Cues for pt to attempt ambulating. Pt unable to safely move walker or step LLE forward. Pt able to move RLE, but unable to ambulate. Cues for pt to back up to chair. Pt able to take a couple small steps backwards. Mod assist x 2 with stand to sit transfer. Verbal cues for safety. Pt sitting up in chair at end session with chair alarm on and call light within reach.   PT Discharge Planning   PT Plan progress transfers, ambulation with chair follow, therex   PT Discharge Recommendation (DC Rec) (S)  Transitional Care Facility   PT Rationale for DC Rec Pt requires assist with transfers for safety. Mod-max assist with sit<>stand transfers. Pt would benefit from 24 hour assist for safety and continued PT to improve functional mobility.   PT Brief overview of current status Mod assist x 2/max assist x 1 with FWW for sit<>stand transfers   PT Total Distance Amb During Session (feet) 0   Physical Therapy Time and Intention   Timed Code Treatment Minutes 25   Total Session Time (sum of timed and untimed services) 35       Mile Raygoza, PT, DPT

## 2025-03-25 NOTE — PLAN OF CARE
Goal Outcome Evaluation:    Patient is A&O, VSS: on 2 L via NC.Tele:S.Jimmy w /frequent PVC's, HR 50's. Denies pain, chest pain, SOB, dizziness, nausea. CMS intact, meds crushed given with applesauce.Tolerating diet, oral intake, voiding via guevara: patent and draining, no BM this shift. Has not been out of bed, Q2 turns. PIV infusing LR at 10 ml/hr. Bed alarm on, call light within reach. Discharge pending.        Patient vital signs are at baseline: No,  Reason:  On 2 L of O 2 via NC  Patient able to ambulate as they were prior to admission or with assist devices provided by therapies during their stay:  No,  Reason:  Not out of bed yet  Patient MUST void prior to discharge:  No,  Reason:  Guevara in place, patent and draining  Patient able to tolerate oral intake:  Yes  Pain has adequate pain control using Oral analgesics:  Yes  Does patient have an identified :  Yes  Has goal D/C date and time been discussed with patient:  No,  Reason:  PT and OT are yet to see patient.    Problem: Adult Inpatient Plan of Care  Goal: Optimal Comfort and Wellbeing  Outcome: Progressing  Intervention: Monitor Pain and Promote Comfort  Recent Flowsheet Documentation  Taken 3/24/2025 2339 by Mercedes Medrano RN  Pain Management Interventions:   care clustered   relaxation techniques promoted   repositioned   rest  Intervention: Provide Person-Centered Care  Recent Flowsheet Documentation  Taken 3/24/2025 2339 by Mercedes Medrano RN  Trust Relationship/Rapport:   care explained   choices provided     Problem: Fall Injury Risk  Goal: Absence of Fall and Fall-Related Injury  Outcome: Progressing  Intervention: Identify and Manage Contributors  Recent Flowsheet Documentation  Taken 3/24/2025 2339 by Mercedes Medrano RN  Medication Review/Management:   medications reviewed   high-risk medications identified  Intervention: Promote Injury-Free Environment  Recent Flowsheet Documentation  Taken 3/24/2025 2339 by Mercedes Medrano RN  Safety  Promotion/Fall Prevention:   activity supervised   assistive device/personal items within reach   clutter free environment maintained   increased rounding and observation   lighting adjusted   increase visualization of patient   nonskid shoes/slippers when out of bed   mobility aid in reach   room door open   room near nurse's station   room organization consistent   safety round/check completed

## 2025-03-25 NOTE — PROGRESS NOTES
"Orthopedic Progress Note      Assessment: 1 Day Post-Op  S/P Procedure(s):  INTERNAL FIXATION, FRACTURE, TROCHANTERIC, HIP, USING PINS OR RODS, USING HANA TABLE @    Plan:   - Continue PT/OT.   - Weightbearing status: WBAT  - Anticoagulation: ASA 81 mg in addition to SCDs, jessica stockings and early ambulation.  - Discharge planning: Discharge pending therapy and medical clearance     Subjective:  Pain: Well controlled on oral meds  Nausea, Vomiting:  No  Chest pain: No  Lightheadedness, Dizziness:  No  Neuro:  Patient denies new onset numbness or paresthesias     Patient doing well on POD #1. Ambulating, tolerating oral intake, voiding & pain is controlled with oral medications. Hgb 10.6 (12.6 pre-op).     Objective:  BP (!) 147/70 (BP Location: Right arm, Patient Position: Semi-Gordon's, Cuff Size: Adult Regular)   Pulse 64   Temp 97.4  F (36.3  C) (Oral)   Resp 12   Ht 1.727 m (5' 8\")   Wt 72.6 kg (160 lb)   SpO2 94%   BMI 24.33 kg/m    The patient is A&Ox3. Appears comfortable, sitting up at bedside.  Calves are soft and non-tender bilaterally  Brisk capillary refill in the toes.   Palpable left dorsalis pedis pulse. Foot warm & well-perfused.  Sensation is intact to light touch & equal bilaterally in the femoral, DP, SP & tibial nerve distributions.  ROM: Flexes at left hip. Appropriately flexes & extends all toes bilaterally.   Motor: +5/5 dorsiflexion, plantar flexion & EHL bilaterally. Fires quad.   Leg lengths equal.  left hip dressing C/D/I without strikethrough, no surrounding erythema.       5/5 TA/GSC/EHL.         Pertinent Labs   Lab Results: personally reviewed.   Lab Results   Component Value Date    INR 0.96 03/23/2025     Lab Results   Component Value Date    WBC 9.0 03/25/2025    HGB 10.6 (L) 03/25/2025    HCT 31.6 (L) 03/25/2025    MCV 98 03/25/2025     03/25/2025     Lab Results   Component Value Date     03/25/2025    CO2 22 03/25/2025         Report completed by:  Crissy DOMINGO" MARIZA Lane/Dr. Lares  [facility]    Date: 3/25/2025  Time: 8:42 AM

## 2025-03-25 NOTE — PROGRESS NOTES
[facility] Medicine PROGRESS NOTE      Identification/Summary:   84-year-old male presented to the emergency department after a fall.  Found to have a left hip fracture.  Also has a medical history of coronary disease, ischemic cardiomyopathy.  He had preop evaluation also showed bigeminy, hypoxia on room air, EKG suggestive of pulmonary vascular congestion.  Echocardiogram ordered and cardiology consult ordered to evaluate preoperative risk reduction.  Echocardiogram with EF of 50 to 55%, biatrial dilatation, moderate to severe tricuspid regurgitation, severe pulmonary hypertension.  BNP elevated at 4000.    Taken to the operating room for hemiarthroplasty on 3/24.    Feeling well today, no complaints of chest pain, shortness of breath.  Was unable to void yesterday, unable to pass Anderson catheter and urology was consulted.  Anderson catheter placed now, Flomax started, plan trial of voiding after discharge.    Assessment and Plan:  Left hip femoral neck fracture  Appreciate orthopedic recommendations  POD 1 semiarthroplasty  Postoperative pain control, DVT prophylaxis per orthopedic surgery    Coronary artery disease, distant history of CABG  Chronic congestive heart failure with relatively preserved ejection fraction  Severe pulmonary hypertension  Severe tricuspid regurgitation  Essential hypertension  Persistent ventricular bigeminy  History of stroke, on aspirin and statin chronically  Appreciate cardiology evaluation  Getting lasix today IV, starting oral tomorrow  Watch for worsening heart failure in the perioperative period  Continue metoprolol, losartan, amlodipine    Urinary retention  Appreciate urology help, Anderson placed  Started on Flomax, trial of voiding as an outpatient    Mild room air hypoxia  Not requiring oxygen today  Encourage incentive spirometry  Chest x-ray without obvious pneumonia, did show evidence of vascular congestion  Supplemental oxygen as needed    Chronic macrocytic  anemia  Mild expected postoperative drop in hemoglobin  Further evaluation and treatment as an outpatient    Insomnia  Takes Remeron at bedtime    Chronic pain  Takes Lyrica 75 mg twice daily    Diet: Advance Diet as Tolerated: Regular Diet Adult  Fluids: None  Pain meds: Tylenol and oxycodone  Therapy: PT and OT  DVT Prophylaxis: Postoperatively per orthopedic surgery  Code Status: Full Code  Medically Ready for Discharge: Anticipated in 2-4 Days        Interval History/Subjective:  Feels pretty good today.  Denies any chest pain or shortness of breath.  Tolerating Anderson catheter.    Physical Exam/Objective:  Gen: no acute distress, comfortable  ENT: no scleral icterus  Pulm: lungs are diminished at bases bilaterally, no wheezing, soft basilar crackles noted  CV: Regular with frequent ectopy  GI: abdomen is soft, non-tender, non-distended with active bowel sounds.  Derm: Warm, dry, not pale  Psych: appropriate affect    Medications:   Current Facility-Administered Medications   Medication Dose Route Frequency Provider Last Rate Last Admin    acetaminophen (TYLENOL) tablet 975 mg  975 mg Oral Q8H Roberto Carlos Yates DO   975 mg at 03/25/25 0800    amLODIPine (NORVASC) tablet 10 mg  10 mg Oral Daily Kya Ramos MD   10 mg at 03/25/25 0755    aspirin EC tablet 81 mg  81 mg Oral BID Tres Bar PA-C   81 mg at 03/25/25 0755    atorvastatin (LIPITOR) tablet 80 mg  80 mg Oral At Bedtime Kya Ramos MD   80 mg at 03/24/25 2110    [START ON 3/26/2025] furosemide (LASIX) tablet 20 mg  20 mg Oral Daily Hannah Torres MD        losartan (COZAAR) tablet 25 mg  25 mg Oral Daily Hannah Torres MD   25 mg at 03/25/25 0921    metoprolol succinate ER (TOPROL XL) 24 hr tablet 25 mg  25 mg Oral BID Jose Valderrama MD   25 mg at 03/25/25 0756    mirtazapine (REMERON) tablet 15 mg  15 mg Oral At Bedtime Kya Ramos MD   15 mg at 03/24/25 2110    pantoprazole (PROTONIX) EC tablet 40 mg  40 mg Oral QPM Roberto Carlos Yates DO   40 mg at  03/24/25 2000    polyethylene glycol (MIRALAX) Packet 17 g  17 g Oral Daily Tres Bar PA-C   17 g at 03/25/25 0755    pregabalin (LYRICA) capsule 75 mg  75 mg Oral BID Kya Ramos MD   75 mg at 03/25/25 0756    senna-docusate (SENOKOT-S/PERICOLACE) 8.6-50 MG per tablet 1 tablet  1 tablet Oral BID Tres Bar PA-C   1 tablet at 03/25/25 0755    sodium chloride (PF) 0.9% PF flush 3 mL  3 mL Intracatheter Q8H TAMARA Tres Bar PA-C   3 mL at 03/25/25 1212    sodium chloride (PF) 0.9% PF flush 3 mL  3 mL Intracatheter Q8H Kya Ramos MD   3 mL at 03/25/25 1103    tamsulosin (FLOMAX) capsule 0.4 mg  0.4 mg Oral Daily Roberto Carlos Yates DO   0.4 mg at 03/25/25 0755    [START ON 3/29/2025] vitamin D2 (ERGOCALCIFEROL) 65540 units (1250 mcg) capsule 50,000 Units  50,000 Units Oral Weekly Kya Ramos MD         Clinically Significant Risk Factors          # Hyperchloremia: Highest Cl = 108 mmol/L in last 2 days, will monitor as appropriate                             # Financial/Environmental Concerns: none            Roberto Carlos Yates DO   Hospitalist  [facility]

## 2025-03-26 ENCOUNTER — APPOINTMENT (OUTPATIENT)
Dept: OCCUPATIONAL THERAPY | Facility: CLINIC | Age: 85
DRG: 480 | End: 2025-03-26
Payer: MEDICARE

## 2025-03-26 VITALS
DIASTOLIC BLOOD PRESSURE: 63 MMHG | HEIGHT: 68 IN | SYSTOLIC BLOOD PRESSURE: 155 MMHG | RESPIRATION RATE: 18 BRPM | BODY MASS INDEX: 24.25 KG/M2 | WEIGHT: 160 LBS | TEMPERATURE: 97.3 F | OXYGEN SATURATION: 93 % | HEART RATE: 64 BPM

## 2025-03-26 PROBLEM — R33.9 URINARY RETENTION WITH INCOMPLETE BLADDER EMPTYING: Status: ACTIVE | Noted: 2025-03-26

## 2025-03-26 PROBLEM — I50.32 CHRONIC DIASTOLIC CONGESTIVE HEART FAILURE (H): Status: ACTIVE | Noted: 2025-03-26

## 2025-03-26 LAB
ANION GAP SERPL CALCULATED.3IONS-SCNC: 11 MMOL/L (ref 7–15)
BUN SERPL-MCNC: 34.7 MG/DL (ref 8–23)
CALCIUM SERPL-MCNC: 8.9 MG/DL (ref 8.8–10.4)
CHLORIDE SERPL-SCNC: 103 MMOL/L (ref 98–107)
CREAT SERPL-MCNC: 1.13 MG/DL (ref 0.67–1.17)
EGFRCR SERPLBLD CKD-EPI 2021: 64 ML/MIN/1.73M2
GLUCOSE BLDC GLUCOMTR-MCNC: 102 MG/DL (ref 70–99)
GLUCOSE SERPL-MCNC: 104 MG/DL (ref 70–99)
HCO3 SERPL-SCNC: 23 MMOL/L (ref 22–29)
HGB BLD-MCNC: 10 G/DL (ref 13.3–17.7)
POTASSIUM SERPL-SCNC: 4.2 MMOL/L (ref 3.4–5.3)
SODIUM SERPL-SCNC: 137 MMOL/L (ref 135–145)

## 2025-03-26 PROCEDURE — 82435 ASSAY OF BLOOD CHLORIDE: CPT | Performed by: HOSPITALIST

## 2025-03-26 PROCEDURE — 36415 COLL VENOUS BLD VENIPUNCTURE: CPT | Performed by: PHYSICIAN ASSISTANT

## 2025-03-26 PROCEDURE — 85018 HEMOGLOBIN: CPT | Performed by: PHYSICIAN ASSISTANT

## 2025-03-26 PROCEDURE — 99232 SBSQ HOSP IP/OBS MODERATE 35: CPT | Performed by: INTERNAL MEDICINE

## 2025-03-26 PROCEDURE — 250N000013 HC RX MED GY IP 250 OP 250 PS 637: Performed by: HOSPITALIST

## 2025-03-26 PROCEDURE — 97535 SELF CARE MNGMENT TRAINING: CPT | Mod: GO

## 2025-03-26 PROCEDURE — 250N000013 HC RX MED GY IP 250 OP 250 PS 637: Performed by: PHYSICIAN ASSISTANT

## 2025-03-26 PROCEDURE — 99239 HOSP IP/OBS DSCHRG MGMT >30: CPT | Performed by: HOSPITALIST

## 2025-03-26 PROCEDURE — 80048 BASIC METABOLIC PNL TOTAL CA: CPT | Performed by: HOSPITALIST

## 2025-03-26 PROCEDURE — 250N000013 HC RX MED GY IP 250 OP 250 PS 637: Performed by: INTERNAL MEDICINE

## 2025-03-26 PROCEDURE — 250N000013 HC RX MED GY IP 250 OP 250 PS 637: Performed by: STUDENT IN AN ORGANIZED HEALTH CARE EDUCATION/TRAINING PROGRAM

## 2025-03-26 PROCEDURE — 250N000013 HC RX MED GY IP 250 OP 250 PS 637

## 2025-03-26 RX ORDER — FUROSEMIDE 20 MG/1
20 TABLET ORAL DAILY
Status: SHIPPED
Start: 2025-03-26

## 2025-03-26 RX ORDER — METHOCARBAMOL 500 MG/1
500 TABLET, FILM COATED ORAL 4 TIMES DAILY
Status: SHIPPED
Start: 2025-03-26

## 2025-03-26 RX ORDER — AMOXICILLIN 250 MG
1 CAPSULE ORAL 2 TIMES DAILY PRN
Status: SHIPPED
Start: 2025-03-26

## 2025-03-26 RX ORDER — ASPIRIN 81 MG/1
81 TABLET ORAL 2 TIMES DAILY
Status: SHIPPED
Start: 2025-03-26

## 2025-03-26 RX ORDER — OXYCODONE HYDROCHLORIDE 5 MG/1
5 TABLET ORAL EVERY 4 HOURS PRN
Qty: 10 TABLET | Refills: 0 | Status: SHIPPED | OUTPATIENT
Start: 2025-03-26

## 2025-03-26 RX ORDER — TAMSULOSIN HYDROCHLORIDE 0.4 MG/1
0.4 CAPSULE ORAL DAILY
Status: SHIPPED
Start: 2025-03-26

## 2025-03-26 RX ORDER — METHOCARBAMOL 500 MG/1
500 TABLET, FILM COATED ORAL 4 TIMES DAILY
Status: DISCONTINUED | OUTPATIENT
Start: 2025-03-26 | End: 2025-03-26 | Stop reason: HOSPADM

## 2025-03-26 RX ADMIN — ACETAMINOPHEN 975 MG: 325 TABLET ORAL at 08:47

## 2025-03-26 RX ADMIN — METOPROLOL SUCCINATE 25 MG: 25 TABLET, EXTENDED RELEASE ORAL at 09:24

## 2025-03-26 RX ADMIN — SENNOSIDES AND DOCUSATE SODIUM 1 TABLET: 50; 8.6 TABLET ORAL at 08:48

## 2025-03-26 RX ADMIN — ASPIRIN 81 MG: 81 TABLET, COATED ORAL at 08:48

## 2025-03-26 RX ADMIN — OXYCODONE 10 MG: 5 TABLET ORAL at 13:53

## 2025-03-26 RX ADMIN — ACETAMINOPHEN 975 MG: 325 TABLET ORAL at 01:24

## 2025-03-26 RX ADMIN — HYDROXYZINE HYDROCHLORIDE 10 MG: 10 TABLET ORAL at 08:47

## 2025-03-26 RX ADMIN — OXYCODONE 5 MG: 5 TABLET ORAL at 04:44

## 2025-03-26 RX ADMIN — FUROSEMIDE 20 MG: 20 TABLET ORAL at 09:24

## 2025-03-26 RX ADMIN — PREGABALIN 75 MG: 75 CAPSULE ORAL at 08:48

## 2025-03-26 RX ADMIN — AMLODIPINE BESYLATE 10 MG: 10 TABLET ORAL at 09:24

## 2025-03-26 RX ADMIN — OXYCODONE 10 MG: 5 TABLET ORAL at 08:46

## 2025-03-26 RX ADMIN — METHOCARBAMOL 500 MG: 500 TABLET ORAL at 13:26

## 2025-03-26 RX ADMIN — LOSARTAN POTASSIUM 25 MG: 25 TABLET, FILM COATED ORAL at 09:24

## 2025-03-26 RX ADMIN — POLYETHYLENE GLYCOL 3350 17 G: 17 POWDER, FOR SOLUTION ORAL at 08:48

## 2025-03-26 RX ADMIN — METHOCARBAMOL 500 MG: 500 TABLET ORAL at 09:57

## 2025-03-26 RX ADMIN — TAMSULOSIN HYDROCHLORIDE 0.4 MG: 0.4 CAPSULE ORAL at 09:24

## 2025-03-26 ASSESSMENT — ACTIVITIES OF DAILY LIVING (ADL)
ADLS_ACUITY_SCORE: 64
ADLS_ACUITY_SCORE: 64
ADLS_ACUITY_SCORE: 68
ADLS_ACUITY_SCORE: 64
ADLS_ACUITY_SCORE: 68
ADLS_ACUITY_SCORE: 64

## 2025-03-26 NOTE — PROGRESS NOTES
Hospitalist follow up note:    Patient seen, medically doing well.  Has some trouble with disorientation at night.  Does not really have any complaints today other than persistent hip pain.  Tolerating Anderson catheter, will plan to keep this in at discharge and follow-up with urology for trial of void in about a week.  Basic metabolic panel is pending, assuming this is unremarkable we will plan to discharge to TCU today.  Started on Flomax for urinary retention, Lasix for pulmonary edema, pulmonary hypertension.  Discharge orders completed, full note to follow.    Roberto Carlos Yates DO   Pager #: [phone]

## 2025-03-26 NOTE — PROGRESS NOTES
Physical Therapy Discharge Summary    Reason for therapy discharge:    Discharged to transitional care facility.    Progress towards therapy goal(s). See goals on Care Plan in [facility] electronic health record for goal details.  Goals not met.  Barriers to achieving goals:   discharge from facility.    Therapy recommendation(s):    Continued therapy is recommended.  Rationale/Recommendations:  TCU.

## 2025-03-26 NOTE — PROGRESS NOTES
"Orthopedic Progress Note      Assessment: 2 Day Post-Op  S/P Procedure(s):  INTERNAL FIXATION, FRACTURE, TROCHANTERIC, LEFT HIP, USING PINS OR RODS, USING HANA TABLE @    Plan:   - Continue PT/OT.   - Weightbearing status: WBAT  - Anticoagulation: ASA 81 mg in addition to SCDs, jessica stockings and early ambulation.  - Discharge planning: Discharge to TCU today     Subjective:  Pain: Well controlled on oral meds  Nausea, Vomiting:  No  Chest pain: No  Lightheadedness, Dizziness:  No  Neuro:  Patient denies new onset numbness or paresthesias     Patient doing well on POD #2. Ambulating, tolerating oral intake, voiding & pain is controlled with oral medications. Hgb 10 (12.6 pre-op).     Objective:  BP (!) 155/63 (BP Location: Right arm)   Pulse 64   Temp 97.3  F (36.3  C) (Oral)   Resp 18   Ht 1.727 m (5' 8\")   Wt 72.6 kg (160 lb)   SpO2 93%   BMI 24.33 kg/m    The patient is A&Ox3. Appears comfortable, sitting up at bedside.  Calves are soft and non-tender bilaterally  Brisk capillary refill in the toes.   Palpable left dorsalis pedis pulse. Foot warm & well-perfused.  Sensation is intact to light touch & equal bilaterally in the femoral, DP, SP & tibial nerve distributions.  ROM: Flexes at left hip. Appropriately flexes & extends all toes bilaterally.   Motor: +5/5 dorsiflexion, plantar flexion & EHL bilaterally. Fires quad.   Leg lengths equal.  left hip dressing C/D/I without strikethrough, no surrounding erythema.       5/5 TA/GSC/EHL.         Pertinent Labs   Lab Results: personally reviewed.   Lab Results   Component Value Date    INR 0.96 03/23/2025     Lab Results   Component Value Date    WBC 9.0 03/25/2025    HGB 10.0 (L) 03/26/2025    HCT 31.6 (L) 03/25/2025    MCV 98 03/25/2025     03/25/2025     Lab Results   Component Value Date     03/26/2025    CO2 23 03/26/2025         Report completed by:  Crissy Lane PA-C/Dr. Lares  [facility]  03/26/25     "

## 2025-03-26 NOTE — PROGRESS NOTES
Occupational Therapy Discharge Summary    Reason for therapy discharge:    Discharged to transitional care facility.    Progress towards therapy goal(s). See goals on Care Plan in [facility] electronic health record for goal details.  Goals not met.  Barriers to achieving goals:   limited tolerance for therapy.    Therapy recommendation(s):    Continued therapy is recommended.  Rationale/Recommendations:  Recommend continued OT to improve independence in ADLs and transfers.

## 2025-03-26 NOTE — PROGRESS NOTES
Discharge AVS reviewed with patient's daughter Fe via phone.  Questions answered and teachings acknowledged.  Belongings and paperwork + TCU packet with Oxycodone Rx sent with via [facility] WC transport pending arrival.

## 2025-03-26 NOTE — DISCHARGE SUMMARY
[facility] MEDICINE  DISCHARGE SUMMARY     Primary Care Physician: Marcella Patterson  Admission Date: 3/23/2025   Discharge Provider: Roberto Carlos Yates DO Discharge Date: 3/26/2025   Diet:   Active Diet and Nourishment Order   Procedures    Advance Diet as Tolerated: Regular Diet Adult    Diet       Code Status: Full Code   Activity: Per therapy        Condition at Discharge: Stable     REASON FOR PRESENTATION(See Admission Note for Details)   Fall, hip pain  PRINCIPAL & ACTIVE DISCHARGE DIAGNOSES   Left femoral neck fracture  Coronary disease, history of CABG  Chronic just of heart failure with preserved ejection fraction  Severe pulmonary hypertension  Severe tricuspid regurgitation  Essential hypertension  Persistent ventricular bigeminy  History of stroke  Urinary retention  Chronic macrocytic anemia  Insomnia  Chronic pain  Acute metabolic encephalopathy, mild situational confusion  Chronic dysphagia, high aspiration risk  PENDING LABS     Unresulted Labs Ordered in the Past 30 Days of this Admission       No orders found from 2/21/2025 to 3/24/2025.          PROCEDURES ( this hospitalization only)    Procedure(s):  INTERNAL FIXATION, FRACTURE, TROCHANTERIC, LEFT HIP, USING PINS OR RODS, USING HANA TABLE  RECOMMENDATIONS TO OUTPATIENT PROVIDER FOR F/U VISIT     Follow-up Appointments       Follow Up Care      Follow-up with your Surgeon Team in 2 weeks for wound check.        Follow Up and recommended labs and tests      Follow up with residential physician.  The following labs/tests are recommended: Basic metabolic panel.              Follow weight and volume status carefully as he has been initiated on Lasix  Make sure he has follow-up with urology in about a week for trial of voiding, catheter removal imaging studies as above  DISPOSITION   Skilled Nursing Facility  SUMMARY OF HOSPITAL COURSE:    84-year-old male presented to the emergency department after a fall.  Found to  have a left hip fracture.  Also has a medical history of coronary disease, ischemic cardiomyopathy.  He had preop evaluation also showed bigeminy, hypoxia on room air, EKG suggestive of pulmonary vascular congestion.  Echocardiogram ordered and cardiology consult ordered to evaluate preoperative risk reduction.  Echocardiogram with EF of 50 to 55%, biatrial dilatation, moderate to severe tricuspid regurgitation, severe pulmonary hypertension.  BNP elevated at 4000.     Taken to the operating room for hemiarthroplasty on 3/24.     Feeling well today, no complaints of chest pain, shortness of breath.  Was unable to void yesterday, unable to pass Anderson catheter and urology was consulted.  Anderson catheter placed now, Flomax started, plan trial of voiding after discharge.    Has coarse upper airway sounds, difficulty swallowing chronically.  Seen by speech therapy, elected to continue current diet knowing that he is high risk for aspiration.  Discharge Medications with Med changes:     Current Discharge Medication List        START taking these medications    Details   furosemide (LASIX) 20 MG tablet Take 1 tablet (20 mg) by mouth daily.    Associated Diagnoses: Chronic diastolic congestive heart failure (H)      methocarbamol (ROBAXIN) 500 MG tablet Take 1 tablet (500 mg) by mouth 4 times daily.    Associated Diagnoses: Hip fracture, left, closed, initial encounter (H)      oxyCODONE (ROXICODONE) 5 MG tablet Take 1 tablet (5 mg) by mouth every 4 hours as needed for moderate to severe pain.  Qty: 10 tablet, Refills: 0    Associated Diagnoses: Hip fracture, left, closed, initial encounter (H)      senna-docusate (SENOKOT-S/PERICOLACE) 8.6-50 MG tablet Take 1 tablet by mouth 2 times daily as needed for constipation.    Associated Diagnoses: Hip fracture, left, closed, initial encounter (H)      tamsulosin (FLOMAX) 0.4 MG capsule Take 1 capsule (0.4 mg) by mouth daily.    Associated Diagnoses: Urinary retention with  incomplete bladder emptying           CONTINUE these medications which have CHANGED    Details   aspirin 81 MG EC tablet Take 1 tablet (81 mg) by mouth 2 times daily. For 30 days    Associated Diagnoses: Hip fracture, left, closed, initial encounter (H)           CONTINUE these medications which have NOT CHANGED    Details   acetaminophen (TYLENOL) 500 MG tablet Take 1,000 mg by mouth 3 times daily.      amLODIPine (NORVASC) 10 MG tablet Take 10 mg by mouth daily.      atorvastatin (LIPITOR) 80 MG tablet Take 80 mg by mouth at bedtime.      dimethicone-zinc oxide (AMADO PROTECT) external cream Apply topically daily as needed for dry skin or other. To intact stage 1 pressure area and or irritated skin, rash, or excoriation.      ibuprofen (ADVIL/MOTRIN) 400 MG tablet Take 400 mg by mouth every 6 hours as needed (pain).      loperamide (IMODIUM A-D) 2 MG tablet Take 2 mg by mouth 4 times daily as needed for diarrhea. Take 2 tabs (4 mg) at first loose stool, then 1 tab (2 mg) as needed after each subsequent loose stool.      losartan (COZAAR) 25 MG tablet Take 25 mg by mouth daily.      metoprolol succinate ER (TOPROL XL) 25 MG 24 hr tablet Take 25 mg by mouth 2 times daily.      mirtazapine (REMERON) 15 MG tablet Take 15 mg by mouth at bedtime.      omeprazole (PRILOSEC) 20 MG DR capsule Take 20 mg by mouth every evening.      !! polyethylene glycol (MIRALAX) 17 g packet Take 1 packet by mouth daily.      !! polyethylene glycol (MIRALAX) 17 g packet Take 1 packet by mouth daily as needed for constipation.      pregabalin (LYRICA) 75 MG capsule Take 75 mg by mouth 2 times daily.      vitamin D2 (ERGOCALCIFEROL) 15321 units (1250 mcg) capsule Take 50,000 Units by mouth once a week. Saturdays       !! - Potential duplicate medications found. Please discuss with provider.        Rationale for medication changes:    As above  Consults   Orthopedic surgery, cardiology  Anticoagulation Information    Not  applicable  SIGNIFICANT IMAGING FINDINGS     Results for orders placed or performed during the hospital encounter of 03/23/25   XR Pelvis and Hip Left 2 Views    Impression    IMPRESSION: Acute mildly displaced left proximal femoral fracture centered in the intertrochanteric region. Bones are markedly demineralized and there is mild left hip arthrosis. Evidence of prior left pelvic/acetabular ORIF.    Atherosclerotic vascular calcifications.    NOTE: ABNORMAL REPORT    THE DICTATION ABOVE DESCRIBES AN ABNORMALITY FOR WHICH FOLLOW-UP IS NEEDED.    XR Femur Left 2 Views    Impression    IMPRESSION: Redemonstrated is ORIF about the left hemipelvis and acetabulum with a mildly displaced proximal femoral fracture centered at the intertrochanteric region.    Bones are demineralized. There is no evidence of a distal femoral fracture. Lucency within the distal femoral shaft is probably related to prior fixation tract.    Atherosclerotic vascular calcifications.       XR Chest Port 1 View    Impression    IMPRESSION: Mildly enlarged heart with CABG related surgical clips. Multiple median sternotomy wires. Retrocardiac opacity, may reflect consolidation, atelectasis, and/or pleural effusion, singly or in combination. Mild pulmonary vascular congestion. No   acute osseous abnormality.   XR Femur Port Left 2 Views    Impression    IMPRESSION: Interval reduction and intramedullary nailing of a proximal left femoral fracture. No instrumentation failure. Expected soft tissue gas. Redemonstrated internal fixation of the left pelvis/acetabulum. Vascular calcifications. No left knee   joint effusion.   XR Pelvis Port 1/2 Views    Impression    IMPRESSION: Interval reduction and intramedullary nailing of a proximal left femoral fracture. No instrumentation failure. Expected soft tissue gas. Redemonstrated internal fixation of the left pelvis/acetabulum. Vascular calcifications. No left knee   joint effusion.   Echocardiogram Complete  "  Result Value Ref Range    LVEF  50-55% (borderline)      SIGNIFICANT LABORATORY FINDINGS   Postoperative hemoglobin dropped from 12.6 down to 10.6  Creatinine on day of discharge 1.13, normal potassium, normal sodium  Discharge Orders        Adult Urology  Referral      General info for SNF    Length of Stay Estimate: Short Term Care: Estimated # of Days <30  Condition at Discharge: Stable  Level of care:skilled   Rehabilitation Potential: Good  Admission H&P remains valid and up-to-date: Yes  Recent Chemotherapy: N/A  Use Nursing Home Standing Orders: Yes     Mantoux instructions    Give two-step Mantoux (PPD) Per Facility Policy Yes     Follow Up and recommended labs and tests    Follow up with retirement physician.  The following labs/tests are recommended: Basic metabolic panel.     Tubes and Drains    Current Tubes and Drains:     Drain  Duration           Urinary Drain 03/24/25 Urethral Catheter 2 days         To straight gravity drainage. Change catheter every 2 weeks and PRN for leaking or decreased urine output with signs of bladder distention. DO NOT change catheter without a specific Provider order IF diagnosis of benign prostatic hypertrophy (BPH), neurogenic bladder, or other urological conditions      Reason for your hospital stay    Hip fracture     Activity - Up with nursing assistance     Activity - Up with assistive device     When to call - Contact Surgeon Team    You may experience symptoms that require follow-up before your scheduled appointment. Refer to the \"Stoplight Tool\" for instructions on when to contact your Surgeon Team if you are concerned about pain control, blood clots, constipation, or if you are unable to urinate.     When to call - Reach out to Urgent Care    If you are not able to reach your Surgeon Team and you need immediate care, go to the Orthopedic Walk-in Clinic or Urgent Care at your Surgeon's office.  Do NOT go to the Emergency Room unless you have shortness " of breath, chest pain, or other signs of a medical emergency.     When to call - Reasons to Call 911    Call 911 immediately if you experience sudden-onset chest pain, arm weakness/numbness, slurred speech, or shortness of breath     Symptoms - Fever Management    A low grade fever can be expected after surgery.  Use acetaminophen (TYLENOL) as needed for fever management.  Contact your Surgeon Team if you have a fever greater than 101.5 F, chills, and/or night sweats.     Symptoms - Constipation management    Constipation (hard, dry bowel movements) is expected after surgery due to the combination of being less active, the anesthetic, and the opioid pain medication.  You can do the following to help reduce constipation:  ~  FLUIDS:  Drink clear liquids (water or Gatorade), or juice (apple/prune).  ~  DIET:  Eat a fiber rich diet.    ~  ACTIVITY:  Get up and move around several times a day.  Increase your activity as you are able.  MEDICATIONS:  Reduce the risk of constipation by starting medications before you are constipated.  You can take Miralax   (1 packet as directed) and/or a stool softener (Senokot 1-2 tablets 1-2 times a day).  If you already have constipation and these medications are not working, you can get magnesium citrate and use as directed.  If you continue to have constipation you can try an over the counter suppository or enema.  Call your Surgeon Team if it has been greater than 3 days since your last bowel movement.     Symptoms - Reduced Urine Output    Changes in the amount of fluids you drank before and after surgery may result in problems urinating.  It is important to stay well-hydrated after surgery and drink plenty of water. If it has been greater than 8 hours since you have urinated despite drinking plenty of water, call your Surgeon Team.     Activity - Exercises to prevent blood clots    Unless otherwise directed by your Surgeon team, perform the following exercises at least three times  per day for the first four weeks after surgery to prevent blood clots in your legs: 1) Point and flex your feet (Ankle Pumps), 2) Move your ankle around in big circles, 3) Wiggle your toes, 4) Walk, even for short distances, several times a day, will help decrease the risk of blood clots.     Comfort and Pain Management - Pain after Surgery    Pain after surgery is normal and expected.  You will have some amount of pain for several weeks after surgery.  Your pain will improve with time.  There are several things you can do to help reduce your pain including: rest, ice, elevation, and using pain medications as needed. Contact your Surgeon Team if you have pain that persists or worsens after surgery despite rest, ice, elevation, and taking your medication(s) as prescribed. Contact your Surgeon Team if you have new numbness, tingling, or weakness in your operative extremity.     Comfort and Pain Management - Swelling after Surgery    Swelling and/or bruising of the surgical extremity is common and may persist for several months after surgery. In addition to frequent icing and elevation, gentle compressive support with an ACE wrap or tubigrip may help with swelling. Apply compression regularly, removing at least twice daily to perform skin checks. Contact your Surgeon Team if your swelling increases and is NOT associated with an increase in your activity level, or if your swelling increases and is associated with redness and pain.     Comfort and Pain Management - Cold therapy    Ice can be used to control swelling and discomfort after surgery. Place a thin towel over your operative site and apply the ice pack overtop. Leave ice pack in place for 20 minutes, then remove for 20 minutes. Repeat this 20 minutes on/20 minutes off routine as often as tolerated.     Medication Instructions - Acetaminophen (TYLENOL) Instructions    You were discharged with acetaminophen (TYLENOL) for pain management after surgery.  "Acetaminophen most effectively manages pain symptoms when it is taken on a schedule without missing doses (every four, six, or eight hours). Your Provider will prescribe a safe daily dose between 3000 - 4000 mg.  Do NOT exceed this daily dose. Most patients use acetaminophen for pain control for the first four weeks after surgery.  You can wean from this medication as your pain decreases.     Medication Instructions - NSAID Instructions    You were discharged with an anti-inflammatory medication for pain management to use in combination with acetaminophen (TYLENOL) and the narcotic pain medication.  Take this medication exactly as directed.  You should only take one anti-inflammatory at a time.  Some common anti-inflammatories include: ibuprofen (ADVIL, MOTRIN), naproxen (ALEVE, NAPROSYN), celecoxib (CELEBREX), meloxicam (MOBIC), ketorolac (TORADOL).  Take this medication with food and water.     Follow Up Care    Follow-up with your Surgeon Team in 2 weeks for wound check.     Shower with wound/dressing covered    You must COVER your dressing or incision with saran wrap (or any other non-permeable covering) to allow the incision to remain dry while showering.  You may shower 3 days after surgery as long as the surgical wound stays dry. Continue to cover your dressing or incision for showering until your first office visit.  You are strictly prohibited from soaking   or submerging the surgical wound underwater.     Medication instructions -  Anticoagulation - aspirin    Take the aspirin as prescribed for a total of four weeks after surgery.  This is given to help minimize your risk of blood clot.     Comfort and Pain Management - LOWER Extremity Elevation    Swelling is expected for several months after surgery. This type of swelling is usually associated with gravity and activity, and can be improved with elevation.   The best way to do this is to get your \"toes above your nose\" by laying down and placing several " pillows lengthwise under your calf and heel. When elevating your leg keep your knee completely straight. Perform this elevation as often as possible especially for the first two weeks after surgery.     Opioid Instructions (Greater than or equal to 65 years)    You were discharged with an opioid medication (hydromorphone, oxycodone, hydrocodone, or tramadol). This medication should only be taken for breakthrough pain that is not controlled with acetaminophen (TYLENOL). If you rate your pain less than 3 you do not need this medication. Pain rating 0-3: You do not need this medication Pain rating 4-6: Take 1/2 tablet every 4-6 hours as needed Pain rating 7-10: Take 1 tablet every 4-6 hours as needed Do not exceed 4 tablets per day     Medication Instructions - Opioids - Tapering Instructions    In the first three days following surgery, your symptoms may warrant use of the narcotic pain medication every four to six hours as prescribed. This is normal. As your pain symptoms improve, focus your efforts on decreasing (tapering) use of narcotic medications. The most successful tapering strategy is to first, decrease the number of tablets you take every 4-6 hours to the minimum prescribed. Then, increase the amount of time between doses. For example: First, taper to   or 1 tablet every 4-6 hours. Then, taper to   or 1 tablet every 6-8 hours. Then, taper to   or 1 tablet every 8-10 hours. Then, taper to   or 1 tablet every 10-12 hours. Then, taper to   or 1 tablet at bedtime. The bedtime dose can help with comfort during sleep and is typically the last dose to be discontinued after surgery.     Physical Therapy Adult Consult    Evaluate and treat as clinically indicated.    Reason: Hip fracture, status post hemiarthroplasty     Occupational Therapy Adult Consult    Evaluate and treat as clinically indicated.    Reason: Hip fracture, status post hemiarthroplasty     Physical Therapy Adult Consult    Evaluate and treat as  clinically indicated.    Reason: Status Post Hip Surgery     Occupational Therapy Adult Consult    Evaluate and treat as clinically indicated.    Reason: Status Post Hip Surgery     Fall precautions     Crutches DME    DME Documentation: Describe the reason for need to support medical necessity: Impaired gait status post hip surgery. I, the undersigned, certify that the above prescribed supplies are medically necessary for this patient and is both reasonable and necessary in reference to accepted standards of medical practice in the treatment of this patient's condition and is not prescribed as a convenience.     Cane DME    DME Documentation: Describe the reason for need to support medical necessity: Impaired gait status post hip surgery. I, the undersigned, certify that the above prescribed supplies are medically necessary for this patient and is both reasonable and necessary in reference to accepted standards of medical practice in the treatment of this patient's condition and is not prescribed as a convenience.     Walker DME    DME Documentation: Describe the reason for need to support medical necessity: Impaired gait status post hip surgery. I, the undersigned, certify that the above prescribed supplies are medically necessary for this patient and is both reasonable and necessary in reference to accepted standards of medical practice in the treatment of this patient's condition and is not prescribed as a convenience.     Diet    Follow this diet upon discharge: Current Diet:Orders Placed This Encounter      Advance Diet as Tolerated: Regular Diet Adult     Examination   Physical Exam   Temp:  [97.3  F (36.3  C)-98.2  F (36.8  C)] 97.3  F (36.3  C)  Pulse:  [61-74] 64  Resp:  [16-18] 18  BP: (100-155)/(51-70) 155/63  SpO2:  [91 %-96 %] 93 %  Wt Readings from Last 1 Encounters:   03/24/25 72.6 kg (160 lb)       Subjective: Denies chest pain, shortness of breath, nausea, vomiting, pain from Anderson catheter.   Nursing notes reviewed, has some confusion at night but this morning he is clear mentally.    Physical Exam:    Gen: no acute distress, comfortable, alert, pleasant  ENT: no scleral icterus  Pulm: lungs are diminished with scattered crackles, coarse upper airway sounds  CV: regular rate and rhythm, no pitting edema  GI: abdomen is soft, non-tender, non-distended with active bowel sounds.  Derm: Warm, dry, not pale  Psych: appropriate affect       Please see EMR for more detailed significant labs, imaging, consultant notes etc.    I, Roberto Carlos Yates DO, personally saw the patient today and spent greater than 30 minutes discharging this patient.    Roberto Carlos Yates DO  [facility]    CC:Marcella Patterson

## 2025-03-26 NOTE — PROGRESS NOTES
Assessment/Plan:  Preop cardiovascular clearance: The patient was doing well with his surgery yesterday.  Coronary artery disease status post CABG in 1999: No chest pain.  Echo was discussed, no significant interval change.  Continue aspirin, atorvastatin, metoprolol succinate.  Chronic congestive heart failure with preserved ejection fraction, secondary pulmonary hypertension: The patient has no complaints of her shortness of breath.  His crackles in both bases of lungs are clear now after he received one dose of iv Lasix 40 mg yesterday.  Continue metoprolol succinate, losartan.  Continue lasix 20 mg oral daily.  Monitor weight and creatinine.  Frequent PVC, bigeminy pattern, 3 beats of nonsustained ventricular tachycardia: His PVC did not reduce LV systolic function.  No palpitations.  Continue to monitor.  Continue metoprolol succinate 25 mg twice a day.  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  Continue monitor and conservative management at this time.  May consider valvular clinic follow-up as outpatient.  Benign essential hypertension: His blood pressure is controlled continue current medications.  Dyslipidemia: Continue atorvastatin.  Left hip femoral fracture, other chronic medical disease: Please see other team management for details.    The patient is stable to be discharged from cardiology standpoint.  Please schedule patient to follow-up with his primary cardiologist at [facility] [place]. \Hospital Has a New Name and Outlook.\"" Dr. Whitten in 2 months.    Subjective:  Interval History:  Has no complaints of chest pain or shortness of breath.  No leg edema.    Current Medications:  Scheduled Meds:  Current Facility-Administered Medications   Medication Dose Route Frequency Provider Last Rate Last Admin    acetaminophen (TYLENOL) tablet 975 mg  975 mg Oral Q8H Roberto Carlos Yates, DO   975 mg at 03/26/25 0847    amLODIPine (NORVASC) tablet 10 mg  10 mg  Oral Daily Kya Ramos MD   10 mg at 03/26/25 0924    aspirin EC tablet 81 mg  81 mg Oral BID Tres Bar PA-C   81 mg at 03/26/25 0848    atorvastatin (LIPITOR) tablet 80 mg  80 mg Oral At Bedtime Kya Ramos MD   80 mg at 03/25/25 2118    furosemide (LASIX) tablet 20 mg  20 mg Oral Daily Hannah Torres MD   20 mg at 03/26/25 0924    losartan (COZAAR) tablet 25 mg  25 mg Oral Daily Hannah Torres MD   25 mg at 03/26/25 0924    methocarbamol (ROBAXIN) tablet 500 mg  500 mg Oral 4x Daily Crissy Lane PA-C        metoprolol succinate ER (TOPROL XL) 24 hr tablet 25 mg  25 mg Oral BID Jose Valderrama MD   25 mg at 03/26/25 0924    mirtazapine (REMERON) tablet 15 mg  15 mg Oral At Bedtime Kya Ramos MD   15 mg at 03/25/25 2118    pantoprazole (PROTONIX) EC tablet 40 mg  40 mg Oral QPM Roberto Carlos Yates DO   40 mg at 03/25/25 2118    polyethylene glycol (MIRALAX) Packet 17 g  17 g Oral Daily Tres Bar PA-C   17 g at 03/26/25 0848    pregabalin (LYRICA) capsule 75 mg  75 mg Oral BID Kya Ramos MD   75 mg at 03/26/25 0848    senna-docusate (SENOKOT-S/PERICOLACE) 8.6-50 MG per tablet 1 tablet  1 tablet Oral BID Tres Bar PA-C   1 tablet at 03/26/25 0848    sodium chloride (PF) 0.9% PF flush 3 mL  3 mL Intracatheter Q8H [facility] Tres Bar PA-C   3 mL at 03/26/25 0448    sodium chloride (PF) 0.9% PF flush 3 mL  3 mL Intracatheter Q8H Kya Ramos MD   3 mL at 03/26/25 0851    tamsulosin (FLOMAX) capsule 0.4 mg  0.4 mg Oral Daily Roberto Carlos Yates DO   0.4 mg at 03/26/25 0924    [START ON 3/29/2025] vitamin D2 (ERGOCALCIFEROL) 37491 units (1250 mcg) capsule 50,000 Units  50,000 Units Oral Weekly Kya Ramos MD           Objective:   Vital signs in last 24 hours:  Temp:  [97.3  F (36.3  C)-98.2  F (36.8  C)] 97.3  F (36.3  C)  Pulse:  [61-74] 64  Resp:  [16-18] 18  BP: (100-155)/(51-70) 155/63  SpO2:  [91 %-96 %] 93 %  Weight:   [unfilled]     Physical Exam:  General Appearance:   Awake,  "Alert, No acute distress.   HEENT:  No scleral icterus; the mucous membranes were moist.   Neck: No cervical bruits. No jugular venous distention   Lungs:   No crackles. No wheezing.   Cardiovascular:   RRR, normal first and second heart sounds with II/VI systolic murmurs at RUSB. No rubs or gallops.     Abdomen:  Soft. No tenderness. Bowels sounds are present   Extremities: No leg edema. Equal posterior tibial pulsese.   Skin: Warm, Dry. No rashes.   Neurologic: Mood and affect are appropriate. No focal deficits.         Cardiographics:   Report: personally reviewed. .      Tele monitoring -   Sinus rhythm with frequent PVCs, 3 beats of NSVT.    Echocardiogram 3-:  1. The left ventricle is normal in size with normal left ventricular wall thickness.  - Left ventricular function is decreased. The ejection fraction is 50-55% (borderline).  2. The right ventricle is mildly dilated with mildly decreased right ventricular systolic function  3. Both atria are moderately dilated.  4. The aortic valve is trileaflet with aortic valve sclerosis.  5. There is moderately severe tricuspid valve regurgitation.  6. Right ventricular systolic pressure is elevated (55 mmHg), consistent withbsevere pulmonary hypertension.  7 The patient exhibited frequent PVCs.  8. There is no comparison study available.      Lab Results:   personally reviewed.     Lab Results   Component Value Date     03/25/2025    CO2 22 03/25/2025    BUN 25.6 03/25/2025     No results found for: \"CKTOTAL\", \"CKMB\", \"TROPONINI\"  Lab Results   Component Value Date    WBC 9.0 03/25/2025    HGB 10.6 03/25/2025    HCT 31.6 03/25/2025    MCV 98 03/25/2025     03/25/2025     No results found for: \"CHOL\", \"TRIG\", \"HDL\", \"LDL\", \"LDLDIRECT\"        "

## 2025-03-26 NOTE — DISCHARGE INSTRUCTIONS
FOLLOW-UP APPOINTMENTS  Please schedule patient to follow-up with his primary cardiologist at [facility] Dr. Whitten in 2 months.

## 2025-03-26 NOTE — PLAN OF CARE
Problem: Adult Inpatient Plan of Care  Goal: Absence of Hospital-Acquired Illness or Injury  Intervention: Prevent Skin Injury  Recent Flowsheet Documentation  Taken 3/26/2025 0020 by Felicity Baer RN  Body Position:   weight shifting   turned   left  Taken 3/25/2025 2115 by Felicity Baer RN  Body Position:   weight shifting   turned   right   Goal Outcome Evaluation:    Patient vital signs are at baseline: Yes, 1L o2 overnight   Patient able to ambulate as they were prior to admission or with assist devices provided by therapies during their stay:  No,  Reason:  is assist x2, was not oob overnight, Q2 turns performed   Patient MUST void prior to discharge:  No,  Reason:  guevara in place, draining adequately  Patient able to tolerate oral intake:  Yes  Pain has adequate pain control using Oral analgesics:  Yes  Does patient have an identified :  Yes  Has goal D/C date and time been discussed with patient:  Yes        Alert to self, pretty confused overnight. Reorientation provided. Prn oxycodone given x1 for moderate pain, effective per pt.

## 2025-03-26 NOTE — PROGRESS NOTES
Care Management Discharge Note    Discharge Date: 03/26/2025       Discharge Disposition: Transitional Care    Discharge Services: None    Discharge DME: None    Discharge Transportation: family or friend will provide    Private pay costs discussed: transportation costs    Does the patient's insurance plan have a 3 day qualifying hospital stay waiver?  No    PAS Confirmation Code: [id number]  Patient/family educated on Medicare website which has current facility and service quality ratings: yes    Education Provided on the Discharge Plan: Yes  Persons Notified of Discharge Plans: Pt, Daughter and   Patient/Family in Agreement with the Plan: yes    Handoff Referral Completed: No, handoff not indicated or clinically appropriate    Additional Information:  Pt is medically ready to discharge to TCU.  Nurse and Ortho PA advise that Pt go by transport as he is assist of 2.  [facility] talked with Pt's daughter Fe as she was going to transport and is fine with [employer] transporting.  ANKUSH scheduled [employer] w/c ride for 3:00 PM. PAS completed. JV updated Pt and TCU.     RADHA Lennon

## 2025-03-27 ENCOUNTER — PATIENT OUTREACH (OUTPATIENT)
Dept: CARE COORDINATION | Facility: CLINIC | Age: 85
End: 2025-03-27
Payer: MEDICARE

## 2025-03-27 NOTE — PROGRESS NOTES
[facility]    Background: Transitional Care Management program identified per system criteria and reviewed by [facility] team for possible outreach.    Assessment: Upon chart review, CCRC Team member will not proceed with patient outreach related to this episode of Transitional Care Management program due to reason below:    Non-[facility] TCU: CCRC team member noted patient discharged to TCU/ARU/LTACH. Patient is not established with a [facility] currently supported by Primary Care-Care Coordination therefore handoff to Primary Care-Care Coordination is not appropriate at this time.    Plan: Transitional Care Management episode addressed appropriately per reason noted above.      TASIA Leung  Connected [facility], [facility]    *Connected Care Resource Team does NOT follow patient ongoing. Referrals are identified based on internal discharge reports and the outreach is to ensure patient has an understanding of their discharge instructions.

## 2025-03-28 ENCOUNTER — LAB REQUISITION (OUTPATIENT)
Dept: LAB | Facility: CLINIC | Age: 85
End: 2025-03-28
Payer: MEDICARE

## 2025-03-28 DIAGNOSIS — I50.40 UNSPECIFIED COMBINED SYSTOLIC (CONGESTIVE) AND DIASTOLIC (CONGESTIVE) HEART FAILURE (H): ICD-10-CM

## 2025-04-02 ENCOUNTER — LAB REQUISITION (OUTPATIENT)
Dept: LAB | Facility: CLINIC | Age: 85
End: 2025-04-02
Payer: MEDICARE

## 2025-04-02 DIAGNOSIS — I50.32 CHRONIC DIASTOLIC (CONGESTIVE) HEART FAILURE (H): ICD-10-CM

## 2025-04-03 LAB
ANION GAP SERPL CALCULATED.3IONS-SCNC: 13 MMOL/L (ref 7–15)
BUN SERPL-MCNC: 26.4 MG/DL (ref 8–23)
CALCIUM SERPL-MCNC: 9.4 MG/DL (ref 8.8–10.4)
CHLORIDE SERPL-SCNC: 110 MMOL/L (ref 98–107)
CREAT SERPL-MCNC: 0.9 MG/DL (ref 0.67–1.17)
EGFRCR SERPLBLD CKD-EPI 2021: 85 ML/MIN/1.73M2
GLUCOSE SERPL-MCNC: 104 MG/DL (ref 70–99)
HCO3 SERPL-SCNC: 24 MMOL/L (ref 22–29)
POTASSIUM SERPL-SCNC: 3.9 MMOL/L (ref 3.4–5.3)
SODIUM SERPL-SCNC: 147 MMOL/L (ref 135–145)

## 2025-04-03 PROCEDURE — 80048 BASIC METABOLIC PNL TOTAL CA: CPT | Mod: ORL | Performed by: INTERNAL MEDICINE

## 2025-04-03 PROCEDURE — P9603 ONE-WAY ALLOW PRORATED MILES: HCPCS | Mod: ORL | Performed by: INTERNAL MEDICINE

## 2025-04-03 PROCEDURE — 82310 ASSAY OF CALCIUM: CPT | Performed by: INTERNAL MEDICINE

## 2025-04-03 PROCEDURE — 36415 COLL VENOUS BLD VENIPUNCTURE: CPT | Mod: ORL | Performed by: INTERNAL MEDICINE

## 2025-04-04 ENCOUNTER — LAB REQUISITION (OUTPATIENT)
Dept: LAB | Facility: CLINIC | Age: 85
End: 2025-04-04
Payer: MEDICARE

## 2025-04-04 DIAGNOSIS — E87.6 HYPOKALEMIA: ICD-10-CM

## 2025-04-08 LAB
ANION GAP SERPL CALCULATED.3IONS-SCNC: 12 MMOL/L (ref 7–15)
BUN SERPL-MCNC: 23 MG/DL (ref 8–23)
CALCIUM SERPL-MCNC: 9.4 MG/DL (ref 8.8–10.4)
CHLORIDE SERPL-SCNC: 102 MMOL/L (ref 98–107)
CREAT SERPL-MCNC: 1.04 MG/DL (ref 0.67–1.17)
EGFRCR SERPLBLD CKD-EPI 2021: 71 ML/MIN/1.73M2
ERYTHROCYTE [DISTWIDTH] IN BLOOD BY AUTOMATED COUNT: 13.2 % (ref 10–15)
GLUCOSE SERPL-MCNC: 97 MG/DL (ref 70–99)
HCO3 SERPL-SCNC: 24 MMOL/L (ref 22–29)
HCT VFR BLD AUTO: 32.1 % (ref 40–53)
HGB BLD-MCNC: 10.1 G/DL (ref 13.3–17.7)
MCH RBC QN AUTO: 32.2 PG (ref 26.5–33)
MCHC RBC AUTO-ENTMCNC: 31.5 G/DL (ref 31.5–36.5)
MCV RBC AUTO: 102 FL (ref 78–100)
PLATELET # BLD AUTO: 480 10E3/UL (ref 150–450)
POTASSIUM SERPL-SCNC: 4 MMOL/L (ref 3.4–5.3)
RBC # BLD AUTO: 3.14 10E6/UL (ref 4.4–5.9)
SODIUM SERPL-SCNC: 138 MMOL/L (ref 135–145)
WBC # BLD AUTO: 7.6 10E3/UL (ref 4–11)

## 2025-04-08 PROCEDURE — 85027 COMPLETE CBC AUTOMATED: CPT | Mod: ORL | Performed by: NURSE PRACTITIONER

## 2025-04-08 PROCEDURE — 80048 BASIC METABOLIC PNL TOTAL CA: CPT | Performed by: NURSE PRACTITIONER

## 2025-04-08 PROCEDURE — 80048 BASIC METABOLIC PNL TOTAL CA: CPT | Mod: ORL | Performed by: NURSE PRACTITIONER

## 2025-04-08 PROCEDURE — P9603 ONE-WAY ALLOW PRORATED MILES: HCPCS | Mod: ORL | Performed by: NURSE PRACTITIONER

## 2025-04-08 PROCEDURE — 36415 COLL VENOUS BLD VENIPUNCTURE: CPT | Mod: ORL | Performed by: NURSE PRACTITIONER

## 2025-06-26 ENCOUNTER — TELEPHONE (OUTPATIENT)
Dept: CARDIOLOGY | Facility: CLINIC | Age: 85
End: 2025-06-26
Payer: MEDICARE

## 2025-06-26 DIAGNOSIS — I07.1 TRICUSPID REGURGITATION: Primary | ICD-10-CM

## 2025-06-26 NOTE — TELEPHONE ENCOUNTER
Patient ws found during [facility] echo review of severe tricuspid regurgitation patients. Dr. Torres consulted with patient in the hospital and noted that he may consider outpatient valve clinic consult. At the time, patient was recovering from hip fracture. Appears patient was discharged home to his longterm in early May. Has not had any follow up that I can see since his discharge.        Will send message to  to help get patient scheduled in clinic to discuss Tricuspid regurg management and therapies that may be available to him.    Ammy Denny RN on 6/26/2025 at 11:34 AM

## (undated) DEVICE — GLOVE BIOGEL PI INDICATOR 8.0 LF 41680

## (undated) DEVICE — DRILL BIT CANN 16.5MM ENTRY GALILEO 0257-000

## (undated) DEVICE — DRSG MEPILEX BORDER ADHS 10CMX20CM STRL 496405

## (undated) DEVICE — SUTURE VICRYL+ 2-0 27IN CT-1 UND VCP259H

## (undated) DEVICE — CUSTOM PACK GEN MAJOR SBA5BGMHEA

## (undated) DEVICE — KIT PATIENT CARE HANA PROFX W/BOOT LINER SUPINE 6851

## (undated) DEVICE — ACTIVATION TOOL

## (undated) DEVICE — MAT FLOOR WATERPROOF BACKSHEET FMBP30

## (undated) DEVICE — DRAPE C-ARM 60X42" 1013

## (undated) DEVICE — ELECTRODE PATIENT RETURN ADULT L10 FT 2 PLATE CORD 0855C

## (undated) DEVICE — GUIDE PIN

## (undated) DEVICE — GUIDE PIN 3.2MM X 330MM S0100-000

## (undated) DEVICE — DRILL CALIBRATED AO STYLE 4.0MM X 280MM S0219-100

## (undated) DEVICE — DRSG MEPILEX BORDER FLEX 3X3" 595200

## (undated) DEVICE — BNDG ELASTIC 4"X5YDS UNSTERILE 6611-40

## (undated) DEVICE — Device

## (undated) DEVICE — DRAPE IOBAN ISOLATION VERTICAL 320X21CM 6617

## (undated) DEVICE — DRAPE C-ARMOR 5 SIDED 5523

## (undated) DEVICE — GOWN IMPERVIOUS BREATHABLE 2XL/XLONG

## (undated) DEVICE — PREP CHLORAPREP 26ML TINTED HI-LITE ORANGE 930815

## (undated) DEVICE — SUCTION TIP FLEXI CLEAR TIP DISP K62

## (undated) DEVICE — SOL WATER IRRIG 1000ML BOTTLE 2F7114

## (undated) DEVICE — SUCTION MANIFOLD NEPTUNE 2 SYS 1 PORT 702-025-000

## (undated) DEVICE — SU VICRYL+ 0 27IN CT-1 UND VCP260H

## (undated) DEVICE — GLOVE BIOGEL PI SZ 8.0 40880

## (undated) RX ORDER — PROPOFOL 10 MG/ML
INJECTION, EMULSION INTRAVENOUS
Status: DISPENSED
Start: 2025-03-24

## (undated) RX ORDER — FENTANYL CITRATE 50 UG/ML
INJECTION, SOLUTION INTRAMUSCULAR; INTRAVENOUS
Status: DISPENSED
Start: 2025-03-24

## (undated) RX ORDER — CEFAZOLIN SODIUM 1 G/3ML
INJECTION, POWDER, FOR SOLUTION INTRAMUSCULAR; INTRAVENOUS
Status: DISPENSED
Start: 2025-03-24